# Patient Record
Sex: FEMALE | Race: WHITE | NOT HISPANIC OR LATINO
[De-identification: names, ages, dates, MRNs, and addresses within clinical notes are randomized per-mention and may not be internally consistent; named-entity substitution may affect disease eponyms.]

---

## 2017-02-06 ENCOUNTER — APPOINTMENT (OUTPATIENT)
Dept: OBGYN | Facility: CLINIC | Age: 72
End: 2017-02-06

## 2017-02-06 VITALS
BODY MASS INDEX: 20.02 KG/M2 | HEIGHT: 63 IN | DIASTOLIC BLOOD PRESSURE: 74 MMHG | WEIGHT: 113 LBS | SYSTOLIC BLOOD PRESSURE: 130 MMHG

## 2017-02-06 DIAGNOSIS — E07.9 DISORDER OF THYROID, UNSPECIFIED: ICD-10-CM

## 2017-02-06 DIAGNOSIS — Z12.12 ENCOUNTER FOR SCREENING FOR MALIGNANT NEOPLASM OF COLON: ICD-10-CM

## 2017-02-06 DIAGNOSIS — Z84.1 FAMILY HISTORY OF DISORDERS OF KIDNEY AND URETER: ICD-10-CM

## 2017-02-06 DIAGNOSIS — Z01.419 ENCOUNTER FOR GYNECOLOGICAL EXAMINATION (GENERAL) (ROUTINE) W/OUT ABNORMAL FINDINGS: ICD-10-CM

## 2017-02-06 DIAGNOSIS — Z12.11 ENCOUNTER FOR SCREENING FOR MALIGNANT NEOPLASM OF COLON: ICD-10-CM

## 2017-02-06 RX ORDER — SIMVASTATIN 40 MG/1
TABLET, FILM COATED ORAL
Refills: 0 | Status: ACTIVE | COMMUNITY

## 2017-02-06 RX ORDER — LEVOTHYROXINE SODIUM 200 MCG
VIAL (EA) INTRAVENOUS
Refills: 0 | Status: ACTIVE | COMMUNITY

## 2017-02-13 ENCOUNTER — MESSAGE (OUTPATIENT)
Age: 72
End: 2017-02-13

## 2017-02-13 LAB — CYTOLOGY CVX/VAG DOC THIN PREP: NORMAL

## 2017-04-17 ENCOUNTER — RX RENEWAL (OUTPATIENT)
Age: 72
End: 2017-04-17

## 2017-04-19 ENCOUNTER — CLINICAL ADVICE (OUTPATIENT)
Age: 72
End: 2017-04-19

## 2017-04-24 ENCOUNTER — RX RENEWAL (OUTPATIENT)
Age: 72
End: 2017-04-24

## 2017-04-24 DIAGNOSIS — R92.0 MAMMOGRAPHIC MICROCALCIFICATION FOUND ON DIAGNOSTIC IMAGING OF BREAST: ICD-10-CM

## 2017-07-18 ENCOUNTER — APPOINTMENT (OUTPATIENT)
Dept: SURGICAL ONCOLOGY | Facility: CLINIC | Age: 72
End: 2017-07-18

## 2017-07-18 VITALS
HEART RATE: 92 BPM | RESPIRATION RATE: 15 BRPM | OXYGEN SATURATION: 98 % | HEIGHT: 63 IN | DIASTOLIC BLOOD PRESSURE: 69 MMHG | SYSTOLIC BLOOD PRESSURE: 156 MMHG | BODY MASS INDEX: 19.49 KG/M2 | WEIGHT: 110 LBS

## 2017-07-18 DIAGNOSIS — Z80.0 FAMILY HISTORY OF MALIGNANT NEOPLASM OF DIGESTIVE ORGANS: ICD-10-CM

## 2017-07-18 DIAGNOSIS — Z86.39 PERSONAL HISTORY OF OTHER ENDOCRINE, NUTRITIONAL AND METABOLIC DISEASE: ICD-10-CM

## 2017-07-18 DIAGNOSIS — C44.311 BASAL CELL CARCINOMA OF SKIN OF NOSE: ICD-10-CM

## 2017-07-18 DIAGNOSIS — Z86.010 PERSONAL HISTORY OF COLONIC POLYPS: ICD-10-CM

## 2017-07-18 RX ORDER — UBIDECARENONE/VIT E ACET 100MG-5
CAPSULE ORAL
Refills: 0 | Status: ACTIVE | COMMUNITY

## 2017-07-26 ENCOUNTER — OUTPATIENT (OUTPATIENT)
Dept: OUTPATIENT SERVICES | Facility: HOSPITAL | Age: 72
LOS: 1 days | End: 2017-07-26
Payer: SELF-PAY

## 2017-07-26 DIAGNOSIS — N63 UNSPECIFIED LUMP IN BREAST: ICD-10-CM

## 2017-07-27 ENCOUNTER — RESULT REVIEW (OUTPATIENT)
Age: 72
End: 2017-07-27

## 2017-07-27 PROCEDURE — 88321 CONSLTJ&REPRT SLD PREP ELSWR: CPT

## 2017-07-31 LAB — SURGICAL PATHOLOGY STUDY: SIGNIFICANT CHANGE UP

## 2017-08-30 ENCOUNTER — FORM ENCOUNTER (OUTPATIENT)
Age: 72
End: 2017-08-30

## 2017-08-31 ENCOUNTER — APPOINTMENT (OUTPATIENT)
Dept: ULTRASOUND IMAGING | Facility: IMAGING CENTER | Age: 72
End: 2017-08-31

## 2017-08-31 ENCOUNTER — RESULT REVIEW (OUTPATIENT)
Age: 72
End: 2017-08-31

## 2017-08-31 ENCOUNTER — OUTPATIENT (OUTPATIENT)
Dept: OUTPATIENT SERVICES | Facility: HOSPITAL | Age: 72
LOS: 1 days | End: 2017-08-31
Payer: MEDICARE

## 2017-08-31 ENCOUNTER — APPOINTMENT (OUTPATIENT)
Dept: ULTRASOUND IMAGING | Facility: IMAGING CENTER | Age: 72
End: 2017-08-31
Payer: MEDICARE

## 2017-08-31 DIAGNOSIS — D05.12 INTRADUCTAL CARCINOMA IN SITU OF LEFT BREAST: ICD-10-CM

## 2017-08-31 PROCEDURE — 88305 TISSUE EXAM BY PATHOLOGIST: CPT | Mod: 26

## 2017-08-31 PROCEDURE — 77065 DX MAMMO INCL CAD UNI: CPT

## 2017-08-31 PROCEDURE — 76642 ULTRASOUND BREAST LIMITED: CPT

## 2017-08-31 PROCEDURE — 19083 BX BREAST 1ST LESION US IMAG: CPT

## 2017-08-31 PROCEDURE — 88305 TISSUE EXAM BY PATHOLOGIST: CPT

## 2017-08-31 PROCEDURE — G0206: CPT | Mod: 26,LT

## 2017-08-31 PROCEDURE — 76642 ULTRASOUND BREAST LIMITED: CPT | Mod: 26,LT

## 2017-08-31 PROCEDURE — A4648: CPT

## 2017-08-31 PROCEDURE — 19083 BX BREAST 1ST LESION US IMAG: CPT | Mod: LT

## 2017-09-05 LAB — SURGICAL PATHOLOGY STUDY: SIGNIFICANT CHANGE UP

## 2017-09-19 ENCOUNTER — OUTPATIENT (OUTPATIENT)
Dept: OUTPATIENT SERVICES | Facility: HOSPITAL | Age: 72
LOS: 1 days | End: 2017-09-19
Payer: MEDICARE

## 2017-09-19 VITALS
RESPIRATION RATE: 14 BRPM | SYSTOLIC BLOOD PRESSURE: 120 MMHG | DIASTOLIC BLOOD PRESSURE: 74 MMHG | HEART RATE: 77 BPM | HEIGHT: 60.75 IN | WEIGHT: 110.89 LBS | TEMPERATURE: 98 F

## 2017-09-19 DIAGNOSIS — E03.9 HYPOTHYROIDISM, UNSPECIFIED: ICD-10-CM

## 2017-09-19 DIAGNOSIS — D05.12 INTRADUCTAL CARCINOMA IN SITU OF LEFT BREAST: ICD-10-CM

## 2017-09-19 DIAGNOSIS — Z98.890 OTHER SPECIFIED POSTPROCEDURAL STATES: Chronic | ICD-10-CM

## 2017-09-19 LAB
BUN SERPL-MCNC: 17 MG/DL — SIGNIFICANT CHANGE UP (ref 7–23)
CALCIUM SERPL-MCNC: 9.9 MG/DL — SIGNIFICANT CHANGE UP (ref 8.4–10.5)
CHLORIDE SERPL-SCNC: 103 MMOL/L — SIGNIFICANT CHANGE UP (ref 98–107)
CO2 SERPL-SCNC: 27 MMOL/L — SIGNIFICANT CHANGE UP (ref 22–31)
CREAT SERPL-MCNC: 0.88 MG/DL — SIGNIFICANT CHANGE UP (ref 0.5–1.3)
GLUCOSE SERPL-MCNC: 134 MG/DL — HIGH (ref 70–99)
HCT VFR BLD CALC: 42.2 % — SIGNIFICANT CHANGE UP (ref 34.5–45)
HGB BLD-MCNC: 14.1 G/DL — SIGNIFICANT CHANGE UP (ref 11.5–15.5)
MCHC RBC-ENTMCNC: 30.2 PG — SIGNIFICANT CHANGE UP (ref 27–34)
MCHC RBC-ENTMCNC: 33.4 % — SIGNIFICANT CHANGE UP (ref 32–36)
MCV RBC AUTO: 90.4 FL — SIGNIFICANT CHANGE UP (ref 80–100)
NRBC # FLD: 0 — SIGNIFICANT CHANGE UP
PLATELET # BLD AUTO: 212 K/UL — SIGNIFICANT CHANGE UP (ref 150–400)
PMV BLD: 9.9 FL — SIGNIFICANT CHANGE UP (ref 7–13)
POTASSIUM SERPL-MCNC: 3.9 MMOL/L — SIGNIFICANT CHANGE UP (ref 3.5–5.3)
POTASSIUM SERPL-SCNC: 3.9 MMOL/L — SIGNIFICANT CHANGE UP (ref 3.5–5.3)
RBC # BLD: 4.67 M/UL — SIGNIFICANT CHANGE UP (ref 3.8–5.2)
RBC # FLD: 12.5 % — SIGNIFICANT CHANGE UP (ref 10.3–14.5)
SODIUM SERPL-SCNC: 142 MMOL/L — SIGNIFICANT CHANGE UP (ref 135–145)
WBC # BLD: 7.02 K/UL — SIGNIFICANT CHANGE UP (ref 3.8–10.5)
WBC # FLD AUTO: 7.02 K/UL — SIGNIFICANT CHANGE UP (ref 3.8–10.5)

## 2017-09-19 PROCEDURE — 93010 ELECTROCARDIOGRAM REPORT: CPT

## 2017-09-19 NOTE — H&P PST ADULT - LYMPHATIC
posterior cervical R/anterior cervical L/supraclavicular L/posterior cervical L/supraclavicular R/anterior cervical R

## 2017-09-19 NOTE — H&P PST ADULT - FAMILY HISTORY
Father  Still living? No  Family history of chronic renal failure, Age at diagnosis: Age Unknown     Sibling  Still living? Yes, Estimated age: Age Unknown  Family history of breast cancer in sister, Age at diagnosis: Age Unknown

## 2017-09-19 NOTE — H&P PST ADULT - RS GEN PE MLT RESP DETAILS PC
no rhonchi/no rales/breath sounds equal/no wheezes/respirations non-labored/clear to auscultation bilaterally

## 2017-09-19 NOTE — H&P PST ADULT - NEGATIVE BREAST SYMPTOMS
no breast lump L/no nipple discharge L/no breast lump R/no nipple discharge R/no breast tenderness R/no breast tenderness L

## 2017-09-19 NOTE — H&P PST ADULT - GASTROINTESTINAL DETAILS
no distention/no guarding/nontender/soft/no bruit/bowel sounds normal/no rebound tenderness/no rigidity/no organomegaly/no masses palpable

## 2017-09-19 NOTE — H&P PST ADULT - PROBLEM SELECTOR PLAN 1
Scheduled for Left Breast Lumpectomy with Mammographic Localization on 10/05/17.  Lab results pending.  Preop, famotidine and chlorhexidine instructions provided and questions addressed.

## 2017-09-19 NOTE — H&P PST ADULT - HISTORY OF PRESENT ILLNESS
71 yr old female with medical hx of hypothyroidism and hyperlipidemia presents for preop evaluation with dx of Intraductal Carcinoma insitu left breast.  Pt s/p routine mammogram and sonogram which revealed mass.  Pt subsequently had biopsy and lumpectomy on June 8.  As per pt "the margins were not enough".  Pt is now scheduled for Left Breast Lumpectomy with Mammography Localization on 10/05/17.

## 2017-09-19 NOTE — H&P PST ADULT - PMH
Hyperlipidemia, unspecified hyperlipidemia type    Hypothyroidism    Intraductal carcinoma in situ of left breast

## 2017-09-27 ENCOUNTER — TRANSCRIPTION ENCOUNTER (OUTPATIENT)
Age: 72
End: 2017-09-27

## 2017-10-04 ENCOUNTER — FORM ENCOUNTER (OUTPATIENT)
Age: 72
End: 2017-10-04

## 2017-10-05 ENCOUNTER — RESULT REVIEW (OUTPATIENT)
Age: 72
End: 2017-10-05

## 2017-10-05 ENCOUNTER — TRANSCRIPTION ENCOUNTER (OUTPATIENT)
Age: 72
End: 2017-10-05

## 2017-10-05 ENCOUNTER — OUTPATIENT (OUTPATIENT)
Dept: OUTPATIENT SERVICES | Facility: HOSPITAL | Age: 72
LOS: 1 days | End: 2017-10-05
Payer: MEDICARE

## 2017-10-05 ENCOUNTER — APPOINTMENT (OUTPATIENT)
Dept: SURGICAL ONCOLOGY | Facility: AMBULATORY SURGERY CENTER | Age: 72
End: 2017-10-05

## 2017-10-05 ENCOUNTER — APPOINTMENT (OUTPATIENT)
Dept: MAMMOGRAPHY | Facility: IMAGING CENTER | Age: 72
End: 2017-10-05
Payer: MEDICARE

## 2017-10-05 ENCOUNTER — OUTPATIENT (OUTPATIENT)
Dept: OUTPATIENT SERVICES | Facility: HOSPITAL | Age: 72
LOS: 1 days | Discharge: HOME | End: 2017-10-05
Payer: MEDICARE

## 2017-10-05 VITALS
HEART RATE: 74 BPM | RESPIRATION RATE: 16 BRPM | WEIGHT: 110.89 LBS | SYSTOLIC BLOOD PRESSURE: 136 MMHG | OXYGEN SATURATION: 97 % | DIASTOLIC BLOOD PRESSURE: 61 MMHG | HEIGHT: 60.63 IN | TEMPERATURE: 97 F

## 2017-10-05 VITALS
TEMPERATURE: 98 F | OXYGEN SATURATION: 98 % | SYSTOLIC BLOOD PRESSURE: 102 MMHG | RESPIRATION RATE: 15 BRPM | HEART RATE: 76 BPM | DIASTOLIC BLOOD PRESSURE: 50 MMHG

## 2017-10-05 DIAGNOSIS — Z00.8 ENCOUNTER FOR OTHER GENERAL EXAMINATION: ICD-10-CM

## 2017-10-05 DIAGNOSIS — Z98.890 OTHER SPECIFIED POSTPROCEDURAL STATES: Chronic | ICD-10-CM

## 2017-10-05 DIAGNOSIS — D05.12 INTRADUCTAL CARCINOMA IN SITU OF LEFT BREAST: ICD-10-CM

## 2017-10-05 PROCEDURE — 88342 IMHCHEM/IMCYTCHM 1ST ANTB: CPT | Mod: 26

## 2017-10-05 PROCEDURE — 76098 X-RAY EXAM SURGICAL SPECIMEN: CPT

## 2017-10-05 PROCEDURE — 19281 PERQ DEVICE BREAST 1ST IMAG: CPT | Mod: LT

## 2017-10-05 PROCEDURE — 76098 X-RAY EXAM SURGICAL SPECIMEN: CPT | Mod: 26

## 2017-10-05 PROCEDURE — 19281 PERQ DEVICE BREAST 1ST IMAG: CPT

## 2017-10-05 PROCEDURE — 19287 PERQ DEV BREAST 1ST MR GUIDE: CPT | Mod: 59

## 2017-10-05 PROCEDURE — 88305 TISSUE EXAM BY PATHOLOGIST: CPT | Mod: 26

## 2017-10-05 PROCEDURE — C1769: CPT

## 2017-10-05 PROCEDURE — 19301 PARTIAL MASTECTOMY: CPT | Mod: LT

## 2017-10-05 NOTE — ASU DISCHARGE PLAN (ADULT/PEDIATRIC). - NOTIFY
Swelling that continues/Fever greater than 101/Bleeding that does not stop/Persistent Nausea and Vomiting

## 2017-10-05 NOTE — BRIEF OPERATIVE NOTE - PROCEDURE
<<-----Click on this checkbox to enter Procedure Lumpectomy of left breast  10/05/2017  Mammogram localization  Active  DFILARDI

## 2017-10-10 LAB — SURGICAL PATHOLOGY STUDY: SIGNIFICANT CHANGE UP

## 2017-10-17 ENCOUNTER — APPOINTMENT (OUTPATIENT)
Dept: SURGICAL ONCOLOGY | Facility: CLINIC | Age: 72
End: 2017-10-17
Payer: MEDICARE

## 2017-10-17 VITALS
DIASTOLIC BLOOD PRESSURE: 71 MMHG | HEIGHT: 63 IN | RESPIRATION RATE: 15 BRPM | HEART RATE: 109 BPM | WEIGHT: 110 LBS | BODY MASS INDEX: 19.49 KG/M2 | TEMPERATURE: 97.6 F | OXYGEN SATURATION: 98 % | SYSTOLIC BLOOD PRESSURE: 130 MMHG

## 2017-10-17 PROCEDURE — 99024 POSTOP FOLLOW-UP VISIT: CPT

## 2017-11-03 ENCOUNTER — OUTPATIENT (OUTPATIENT)
Dept: OUTPATIENT SERVICES | Facility: HOSPITAL | Age: 72
LOS: 1 days | Discharge: HOME | End: 2017-11-03

## 2017-11-03 DIAGNOSIS — Z98.890 OTHER SPECIFIED POSTPROCEDURAL STATES: Chronic | ICD-10-CM

## 2017-11-03 DIAGNOSIS — C50.919 MALIGNANT NEOPLASM OF UNSPECIFIED SITE OF UNSPECIFIED FEMALE BREAST: ICD-10-CM

## 2017-11-06 ENCOUNTER — APPOINTMENT (OUTPATIENT)
Dept: HEMATOLOGY ONCOLOGY | Facility: CLINIC | Age: 72
End: 2017-11-06
Payer: MEDICARE

## 2017-11-06 VITALS
RESPIRATION RATE: 16 BRPM | SYSTOLIC BLOOD PRESSURE: 127 MMHG | BODY MASS INDEX: 19.45 KG/M2 | DIASTOLIC BLOOD PRESSURE: 70 MMHG | OXYGEN SATURATION: 100 % | WEIGHT: 109.79 LBS | HEART RATE: 70 BPM | HEIGHT: 62.8 IN | TEMPERATURE: 98.2 F

## 2017-11-06 DIAGNOSIS — Z78.9 OTHER SPECIFIED HEALTH STATUS: ICD-10-CM

## 2017-11-06 DIAGNOSIS — Z80.3 FAMILY HISTORY OF MALIGNANT NEOPLASM OF BREAST: ICD-10-CM

## 2017-11-06 PROCEDURE — 99205 OFFICE O/P NEW HI 60 MIN: CPT

## 2018-01-31 ENCOUNTER — OUTPATIENT (OUTPATIENT)
Dept: OUTPATIENT SERVICES | Facility: HOSPITAL | Age: 73
LOS: 1 days | Discharge: HOME | End: 2018-01-31

## 2018-01-31 DIAGNOSIS — Z98.890 OTHER SPECIFIED POSTPROCEDURAL STATES: Chronic | ICD-10-CM

## 2018-01-31 DIAGNOSIS — C50.919 MALIGNANT NEOPLASM OF UNSPECIFIED SITE OF UNSPECIFIED FEMALE BREAST: ICD-10-CM

## 2018-02-05 ENCOUNTER — APPOINTMENT (OUTPATIENT)
Dept: HEMATOLOGY ONCOLOGY | Facility: CLINIC | Age: 73
End: 2018-02-05
Payer: MEDICARE

## 2018-02-05 ENCOUNTER — RESULT REVIEW (OUTPATIENT)
Age: 73
End: 2018-02-05

## 2018-02-05 VITALS
WEIGHT: 113.54 LBS | OXYGEN SATURATION: 98 % | HEART RATE: 76 BPM | TEMPERATURE: 97.8 F | RESPIRATION RATE: 16 BRPM | SYSTOLIC BLOOD PRESSURE: 153 MMHG | DIASTOLIC BLOOD PRESSURE: 81 MMHG | BODY MASS INDEX: 20.24 KG/M2

## 2018-02-05 LAB
BASOPHILS # BLD AUTO: 0.1 K/UL — SIGNIFICANT CHANGE UP (ref 0–0.2)
BASOPHILS NFR BLD AUTO: 0.8 % — SIGNIFICANT CHANGE UP (ref 0–2)
EOSINOPHIL # BLD AUTO: 0.1 K/UL — SIGNIFICANT CHANGE UP (ref 0–0.5)
EOSINOPHIL NFR BLD AUTO: 1.3 % — SIGNIFICANT CHANGE UP (ref 0–6)
HCT VFR BLD CALC: 40.4 % — SIGNIFICANT CHANGE UP (ref 34.5–45)
HGB BLD-MCNC: 14.1 G/DL — SIGNIFICANT CHANGE UP (ref 11.5–15.5)
LYMPHOCYTES # BLD AUTO: 2.1 K/UL — SIGNIFICANT CHANGE UP (ref 1–3.3)
LYMPHOCYTES # BLD AUTO: 27.1 % — SIGNIFICANT CHANGE UP (ref 13–44)
MCHC RBC-ENTMCNC: 31.9 PG — SIGNIFICANT CHANGE UP (ref 27–34)
MCHC RBC-ENTMCNC: 34.8 G/DL — SIGNIFICANT CHANGE UP (ref 32–36)
MCV RBC AUTO: 91.5 FL — SIGNIFICANT CHANGE UP (ref 80–100)
MONOCYTES # BLD AUTO: 0.7 K/UL — SIGNIFICANT CHANGE UP (ref 0–0.9)
MONOCYTES NFR BLD AUTO: 8.8 % — SIGNIFICANT CHANGE UP (ref 2–14)
NEUTROPHILS # BLD AUTO: 4.9 K/UL — SIGNIFICANT CHANGE UP (ref 1.8–7.4)
NEUTROPHILS NFR BLD AUTO: 61.9 % — SIGNIFICANT CHANGE UP (ref 43–77)
PLATELET # BLD AUTO: 228 K/UL — SIGNIFICANT CHANGE UP (ref 150–400)
RBC # BLD: 4.42 M/UL — SIGNIFICANT CHANGE UP (ref 3.8–5.2)
RBC # FLD: 11.2 % — SIGNIFICANT CHANGE UP (ref 10.3–14.5)
WBC # BLD: 7.9 K/UL — SIGNIFICANT CHANGE UP (ref 3.8–10.5)
WBC # FLD AUTO: 7.9 K/UL — SIGNIFICANT CHANGE UP (ref 3.8–10.5)

## 2018-02-05 PROCEDURE — 99215 OFFICE O/P EST HI 40 MIN: CPT

## 2018-02-06 LAB
25(OH)D3 SERPL-MCNC: 38.6 NG/ML
ALBUMIN SERPL ELPH-MCNC: 5 G/DL
ALP BLD-CCNC: 54 U/L
ALT SERPL-CCNC: 20 U/L
ANION GAP SERPL CALC-SCNC: 12 MMOL/L
AST SERPL-CCNC: 24 U/L
BILIRUB SERPL-MCNC: 1 MG/DL
BUN SERPL-MCNC: 21 MG/DL
CALCIUM SERPL-MCNC: 10.8 MG/DL
CHLORIDE SERPL-SCNC: 102 MMOL/L
CO2 SERPL-SCNC: 26 MMOL/L
CREAT SERPL-MCNC: 0.87 MG/DL
GLUCOSE SERPL-MCNC: 101 MG/DL
POTASSIUM SERPL-SCNC: 4.9 MMOL/L
PROT SERPL-MCNC: 7.6 G/DL
SODIUM SERPL-SCNC: 140 MMOL/L

## 2018-04-16 ENCOUNTER — FORM ENCOUNTER (OUTPATIENT)
Age: 73
End: 2018-04-16

## 2018-04-17 ENCOUNTER — OUTPATIENT (OUTPATIENT)
Dept: OUTPATIENT SERVICES | Facility: HOSPITAL | Age: 73
LOS: 1 days | End: 2018-04-17
Payer: MEDICARE

## 2018-04-17 ENCOUNTER — APPOINTMENT (OUTPATIENT)
Dept: SURGICAL ONCOLOGY | Facility: CLINIC | Age: 73
End: 2018-04-17
Payer: MEDICARE

## 2018-04-17 ENCOUNTER — APPOINTMENT (OUTPATIENT)
Dept: RADIOLOGY | Facility: IMAGING CENTER | Age: 73
End: 2018-04-17
Payer: MEDICARE

## 2018-04-17 VITALS
HEIGHT: 62 IN | SYSTOLIC BLOOD PRESSURE: 136 MMHG | BODY MASS INDEX: 20.06 KG/M2 | OXYGEN SATURATION: 97 % | DIASTOLIC BLOOD PRESSURE: 78 MMHG | WEIGHT: 109 LBS | HEART RATE: 95 BPM

## 2018-04-17 DIAGNOSIS — Z98.890 OTHER SPECIFIED POSTPROCEDURAL STATES: Chronic | ICD-10-CM

## 2018-04-17 DIAGNOSIS — M85.80 OTHER SPECIFIED DISORDERS OF BONE DENSITY AND STRUCTURE, UNSPECIFIED SITE: ICD-10-CM

## 2018-04-17 PROCEDURE — 99215 OFFICE O/P EST HI 40 MIN: CPT

## 2018-04-17 PROCEDURE — 77080 DXA BONE DENSITY AXIAL: CPT

## 2018-04-17 PROCEDURE — 77080 DXA BONE DENSITY AXIAL: CPT | Mod: 26

## 2018-05-04 ENCOUNTER — RX RENEWAL (OUTPATIENT)
Age: 73
End: 2018-05-04

## 2018-05-15 ENCOUNTER — OUTPATIENT (OUTPATIENT)
Dept: OUTPATIENT SERVICES | Facility: HOSPITAL | Age: 73
LOS: 1 days | End: 2018-05-15
Payer: MEDICARE

## 2018-05-15 ENCOUNTER — APPOINTMENT (OUTPATIENT)
Dept: MAMMOGRAPHY | Facility: IMAGING CENTER | Age: 73
End: 2018-05-15
Payer: MEDICARE

## 2018-05-15 ENCOUNTER — APPOINTMENT (OUTPATIENT)
Dept: ULTRASOUND IMAGING | Facility: IMAGING CENTER | Age: 73
End: 2018-05-15
Payer: MEDICARE

## 2018-05-15 DIAGNOSIS — Z98.890 OTHER SPECIFIED POSTPROCEDURAL STATES: Chronic | ICD-10-CM

## 2018-05-15 DIAGNOSIS — Z00.8 ENCOUNTER FOR OTHER GENERAL EXAMINATION: ICD-10-CM

## 2018-05-15 PROCEDURE — 77066 DX MAMMO INCL CAD BI: CPT

## 2018-05-15 PROCEDURE — 77066 DX MAMMO INCL CAD BI: CPT | Mod: 26

## 2018-05-15 PROCEDURE — G0279: CPT

## 2018-05-15 PROCEDURE — 76641 ULTRASOUND BREAST COMPLETE: CPT | Mod: 26,50

## 2018-05-15 PROCEDURE — G0279: CPT | Mod: 26

## 2018-05-15 PROCEDURE — 76641 ULTRASOUND BREAST COMPLETE: CPT

## 2018-06-05 ENCOUNTER — RX RENEWAL (OUTPATIENT)
Age: 73
End: 2018-06-05

## 2018-07-26 ENCOUNTER — OUTPATIENT (OUTPATIENT)
Dept: OUTPATIENT SERVICES | Facility: HOSPITAL | Age: 73
LOS: 1 days | Discharge: HOME | End: 2018-07-26

## 2018-07-26 DIAGNOSIS — C50.919 MALIGNANT NEOPLASM OF UNSPECIFIED SITE OF UNSPECIFIED FEMALE BREAST: ICD-10-CM

## 2018-07-26 DIAGNOSIS — Z98.890 OTHER SPECIFIED POSTPROCEDURAL STATES: Chronic | ICD-10-CM

## 2018-07-26 PROBLEM — E78.5 HYPERLIPIDEMIA, UNSPECIFIED: Chronic | Status: ACTIVE | Noted: 2017-09-19

## 2018-07-26 PROBLEM — E03.9 HYPOTHYROIDISM, UNSPECIFIED: Chronic | Status: ACTIVE | Noted: 2017-09-19

## 2018-07-26 PROBLEM — D05.12 INTRADUCTAL CARCINOMA IN SITU OF LEFT BREAST: Chronic | Status: ACTIVE | Noted: 2017-09-19

## 2018-07-27 PROBLEM — Z80.0 FAMILY HISTORY OF COLON CANCER: Status: ACTIVE | Noted: 2017-07-18

## 2018-07-27 PROBLEM — C44.311 BASAL CELL CARCINOMA OF NOSE: Status: RESOLVED | Noted: 2017-07-18 | Resolved: 2018-07-27

## 2018-07-30 NOTE — ASU PREOP CHECKLIST - TAMPON REMOVED
n/a My signature below certifies that the above stated patient is homebound and upon completion of the Face-To-Face encounter, has the need for intermittent skilled nursing, physical therapy and/or speech or occupational therapy services in their home for their current diagnosis as outlined in their initial plan of care. These services will continue to be monitored by myself or another physician.

## 2018-07-31 ENCOUNTER — RX RENEWAL (OUTPATIENT)
Age: 73
End: 2018-07-31

## 2018-09-05 ENCOUNTER — OUTPATIENT (OUTPATIENT)
Dept: OUTPATIENT SERVICES | Facility: HOSPITAL | Age: 73
LOS: 1 days | Discharge: HOME | End: 2018-09-05

## 2018-09-05 DIAGNOSIS — Z98.890 OTHER SPECIFIED POSTPROCEDURAL STATES: Chronic | ICD-10-CM

## 2018-09-05 DIAGNOSIS — C50.919 MALIGNANT NEOPLASM OF UNSPECIFIED SITE OF UNSPECIFIED FEMALE BREAST: ICD-10-CM

## 2018-09-12 ENCOUNTER — RESULT REVIEW (OUTPATIENT)
Age: 73
End: 2018-09-12

## 2018-09-12 ENCOUNTER — APPOINTMENT (OUTPATIENT)
Dept: HEMATOLOGY ONCOLOGY | Facility: CLINIC | Age: 73
End: 2018-09-12
Payer: MEDICARE

## 2018-09-12 VITALS
RESPIRATION RATE: 17 BRPM | BODY MASS INDEX: 20.56 KG/M2 | WEIGHT: 112.44 LBS | TEMPERATURE: 98.2 F | DIASTOLIC BLOOD PRESSURE: 75 MMHG | OXYGEN SATURATION: 98 % | HEART RATE: 75 BPM | SYSTOLIC BLOOD PRESSURE: 134 MMHG

## 2018-09-12 LAB
BASOPHILS # BLD AUTO: 0.1 K/UL — SIGNIFICANT CHANGE UP (ref 0–0.2)
BASOPHILS NFR BLD AUTO: 0.9 % — SIGNIFICANT CHANGE UP (ref 0–2)
EOSINOPHIL # BLD AUTO: 0.1 K/UL — SIGNIFICANT CHANGE UP (ref 0–0.5)
EOSINOPHIL NFR BLD AUTO: 1.9 % — SIGNIFICANT CHANGE UP (ref 0–6)
HCT VFR BLD CALC: 42.1 % — SIGNIFICANT CHANGE UP (ref 34.5–45)
HGB BLD-MCNC: 14.2 G/DL — SIGNIFICANT CHANGE UP (ref 11.5–15.5)
LYMPHOCYTES # BLD AUTO: 2.3 K/UL — SIGNIFICANT CHANGE UP (ref 1–3.3)
LYMPHOCYTES # BLD AUTO: 30.3 % — SIGNIFICANT CHANGE UP (ref 13–44)
MCHC RBC-ENTMCNC: 31.1 PG — SIGNIFICANT CHANGE UP (ref 27–34)
MCHC RBC-ENTMCNC: 33.6 G/DL — SIGNIFICANT CHANGE UP (ref 32–36)
MCV RBC AUTO: 92.4 FL — SIGNIFICANT CHANGE UP (ref 80–100)
MONOCYTES # BLD AUTO: 0.8 K/UL — SIGNIFICANT CHANGE UP (ref 0–0.9)
MONOCYTES NFR BLD AUTO: 9.9 % — SIGNIFICANT CHANGE UP (ref 2–14)
NEUTROPHILS # BLD AUTO: 4.3 K/UL — SIGNIFICANT CHANGE UP (ref 1.8–7.4)
NEUTROPHILS NFR BLD AUTO: 57 % — SIGNIFICANT CHANGE UP (ref 43–77)
PLATELET # BLD AUTO: 221 K/UL — SIGNIFICANT CHANGE UP (ref 150–400)
RBC # BLD: 4.56 M/UL — SIGNIFICANT CHANGE UP (ref 3.8–5.2)
RBC # FLD: 11.6 % — SIGNIFICANT CHANGE UP (ref 10.3–14.5)
WBC # BLD: 7.6 K/UL — SIGNIFICANT CHANGE UP (ref 3.8–10.5)
WBC # FLD AUTO: 7.6 K/UL — SIGNIFICANT CHANGE UP (ref 3.8–10.5)

## 2018-09-12 PROCEDURE — 99214 OFFICE O/P EST MOD 30 MIN: CPT

## 2018-09-18 LAB
25(OH)D3 SERPL-MCNC: 37.6 NG/ML
ALBUMIN SERPL ELPH-MCNC: 5.1 G/DL
ALP BLD-CCNC: 60 U/L
ALT SERPL-CCNC: 19 U/L
ANION GAP SERPL CALC-SCNC: 13 MMOL/L
AST SERPL-CCNC: 21 U/L
BILIRUB SERPL-MCNC: 0.7 MG/DL
BUN SERPL-MCNC: 19 MG/DL
CALCIUM SERPL-MCNC: 10 MG/DL
CHLORIDE SERPL-SCNC: 103 MMOL/L
CO2 SERPL-SCNC: 26 MMOL/L
CREAT SERPL-MCNC: 0.83 MG/DL
GLUCOSE SERPL-MCNC: 90 MG/DL
POTASSIUM SERPL-SCNC: 4.8 MMOL/L
PROT SERPL-MCNC: 7.6 G/DL
SODIUM SERPL-SCNC: 142 MMOL/L

## 2019-03-18 ENCOUNTER — OUTPATIENT (OUTPATIENT)
Dept: OUTPATIENT SERVICES | Facility: HOSPITAL | Age: 74
LOS: 1 days | Discharge: HOME | End: 2019-03-18

## 2019-03-18 DIAGNOSIS — Z98.890 OTHER SPECIFIED POSTPROCEDURAL STATES: Chronic | ICD-10-CM

## 2019-03-18 DIAGNOSIS — C50.919 MALIGNANT NEOPLASM OF UNSPECIFIED SITE OF UNSPECIFIED FEMALE BREAST: ICD-10-CM

## 2019-03-22 ENCOUNTER — TRANSCRIPTION ENCOUNTER (OUTPATIENT)
Age: 74
End: 2019-03-22

## 2019-03-22 ENCOUNTER — APPOINTMENT (OUTPATIENT)
Dept: HEMATOLOGY ONCOLOGY | Facility: CLINIC | Age: 74
End: 2019-03-22
Payer: MEDICARE

## 2019-03-22 ENCOUNTER — RESULT REVIEW (OUTPATIENT)
Age: 74
End: 2019-03-22

## 2019-03-22 VITALS
BODY MASS INDEX: 20.77 KG/M2 | SYSTOLIC BLOOD PRESSURE: 166 MMHG | OXYGEN SATURATION: 100 % | RESPIRATION RATE: 22 BRPM | HEART RATE: 75 BPM | WEIGHT: 113.54 LBS | TEMPERATURE: 98.1 F | DIASTOLIC BLOOD PRESSURE: 75 MMHG

## 2019-03-22 LAB
ALBUMIN SERPL ELPH-MCNC: 4.9 G/DL
ALP BLD-CCNC: 69 U/L
ALT SERPL-CCNC: 21 U/L
ANION GAP SERPL CALC-SCNC: 12 MMOL/L
AST SERPL-CCNC: 24 U/L
BASOPHILS # BLD AUTO: 0.1 K/UL — SIGNIFICANT CHANGE UP (ref 0–0.2)
BASOPHILS NFR BLD AUTO: 0.8 % — SIGNIFICANT CHANGE UP (ref 0–2)
BILIRUB SERPL-MCNC: 0.9 MG/DL
BUN SERPL-MCNC: 19 MG/DL
CALCIUM SERPL-MCNC: 10.2 MG/DL
CHLORIDE SERPL-SCNC: 105 MMOL/L
CO2 SERPL-SCNC: 27 MMOL/L
CREAT SERPL-MCNC: 0.78 MG/DL
EOSINOPHIL # BLD AUTO: 0.1 K/UL — SIGNIFICANT CHANGE UP (ref 0–0.5)
EOSINOPHIL NFR BLD AUTO: 1.5 % — SIGNIFICANT CHANGE UP (ref 0–6)
GLUCOSE SERPL-MCNC: 97 MG/DL
HCT VFR BLD CALC: 41.7 % — SIGNIFICANT CHANGE UP (ref 34.5–45)
HGB BLD-MCNC: 14.3 G/DL — SIGNIFICANT CHANGE UP (ref 11.5–15.5)
LYMPHOCYTES # BLD AUTO: 2.2 K/UL — SIGNIFICANT CHANGE UP (ref 1–3.3)
LYMPHOCYTES # BLD AUTO: 28 % — SIGNIFICANT CHANGE UP (ref 13–44)
MCHC RBC-ENTMCNC: 31.2 PG — SIGNIFICANT CHANGE UP (ref 27–34)
MCHC RBC-ENTMCNC: 34.3 G/DL — SIGNIFICANT CHANGE UP (ref 32–36)
MCV RBC AUTO: 90.9 FL — SIGNIFICANT CHANGE UP (ref 80–100)
MONOCYTES # BLD AUTO: 0.7 K/UL — SIGNIFICANT CHANGE UP (ref 0–0.9)
MONOCYTES NFR BLD AUTO: 9 % — SIGNIFICANT CHANGE UP (ref 2–14)
NEUTROPHILS # BLD AUTO: 4.8 K/UL — SIGNIFICANT CHANGE UP (ref 1.8–7.4)
NEUTROPHILS NFR BLD AUTO: 60.8 % — SIGNIFICANT CHANGE UP (ref 43–77)
PLATELET # BLD AUTO: 259 K/UL — SIGNIFICANT CHANGE UP (ref 150–400)
POTASSIUM SERPL-SCNC: 4.1 MMOL/L
PROT SERPL-MCNC: 7.4 G/DL
RBC # BLD: 4.59 M/UL — SIGNIFICANT CHANGE UP (ref 3.8–5.2)
RBC # FLD: 11.4 % — SIGNIFICANT CHANGE UP (ref 10.3–14.5)
SODIUM SERPL-SCNC: 144 MMOL/L
WBC # BLD: 7.9 K/UL — SIGNIFICANT CHANGE UP (ref 3.8–10.5)
WBC # FLD AUTO: 7.9 K/UL — SIGNIFICANT CHANGE UP (ref 3.8–10.5)

## 2019-03-22 PROCEDURE — 99214 OFFICE O/P EST MOD 30 MIN: CPT

## 2019-03-26 NOTE — ASSESSMENT
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 She is status post lumpectomy with negative margins. Her medical history is significant for hypercholesterolemia , osteopenia and hypothyroidism. She is otherwise very healthy, has a great performance status and is working part-time. She started anastrazole 11/2017\par \par - DCIS: Clinically, no evidence of disease. She is tolerating anastrozole very well without significant side effects.  Reports good compliance. Plan to continue AI for total of 5 years. She is up to date with breast imaging.\par - Osteopenia: concern for worsening bone density due to anastrozole. Rec to  continue vit D and start calcium. DEXA 1-2 yrs. \par - Hypercalcemia in the past: now resolved. She will take calcium QOD and we will monitor levels\par - High cholesterol: concern for worsening cholesterol due to anastrozole. Pt is on zocor. Lipid profile annually. Lifestyle modifications reviewed- healthy eating and exercise\par - - Mild hot flashes: 2/2 anastrozole. Advised to wear layers and use fan prn. Consider Effexor if get worse.\par \par RTC 6 m

## 2019-03-26 NOTE — PHYSICAL EXAM
[Fully active, able to carry on all pre-disease performance without restriction] : Status 0 - Fully active, able to carry on all pre-disease performance without restriction [Normal] : affect appropriate [de-identified] : healed lumpectomy scar in the left breast.

## 2019-03-26 NOTE — CONSULT LETTER
[Dear  ___] : Dear  [unfilled], [Consult Letter:] : I had the pleasure of evaluating your patient, [unfilled]. [Please see my note below.] : Please see my note below. [Consult Closing:] : Thank you very much for allowing me to participate in the care of this patient.  If you have any questions, please do not hesitate to contact me. [Sincerely,] : Sincerely, [DrDipika  ___] : Dr. PATEL [FreeTextEntry3] : Angela Berry M.D.\par  of Medicine\par [facility]\par [facility] [place]\par [address] [place] [address]\par [address]\par Tele # [phone]; Fax [phone]

## 2019-03-26 NOTE — OB HISTORY
[Currently In Menopause] : currently in menopause [___] : Living: [unfilled] [Experiencing Menopausal Sxs] : not experiencing menopausal symptoms

## 2019-03-26 NOTE — HISTORY OF PRESENT ILLNESS
[8 - Distress Level] : Distress Level: 8 [Patient given social work contact information and resource sheet] : Patient was given social work contact information and resource sheet [0] : 0 [de-identified] : Ms. LAN CARO is a 72 year old female here for an evaluation of breast cancer. Her oncologic history is as follows:\par \par She underwent routine breast imaging on 4/17/17 which showed suspicious subcentimeter nodule in the left breast for which biopsy was recommended. She underwent left breast 11:00 ultrasound-guided core biopsy on 4/17/17 which showed atypical lobular hyperplasia. She underwent breast MRI on 5/12/17 which was read as BI-RADS 2\par \par She underwent excision with wire guided localization by Dr Filipe Pineda on 6/8/17 which showed intraductal carcinoma, grade 2, cribriform and micropapillary type with intermediate to low nuclear grade, 4 mm in greatest dimension. Intraductal carcinoma was 0.8 mm from the inked anterior margin. DCIS was strongly positive for ER and [place]. Focus of LCIS and atypical hyperplasia was noted.\par \par She seeked a second opinion from Dr. Hill. She underwent a breast MRI on 8/4/17 which showed a 2.1 cm area of enhancement in the upper outer quadrant of the left breast for which surgical excision was recommended.\par \par She underwent a left breast 11:00 core biopsy on 8/21/17 which showed ductal carcinoma in site 2, cribriform pattern with low grade nuclear atypia measured 1.5 mm with biopsy site changes.\par \par She underwent reexcision on 10/5/17 which showed a focus of atypical ductal hyperplasia and LCIS, classic type. Margins negative.\par \par Most recent bone density is from April 2017 which showed osteopenia with the lowest T score of -2.2 and the right femoral neck [de-identified] : Ms. 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 She has mild hot flashes. No aches/pain, no vag dryness, no excessive fatigue, no GI s/e. She is active, no change in energy, wt or appetite. Her cholesterol f/b PMD- well controlled\par mammogram with Dr Howard 5/2018 BIRADS 2\par DEXA- 04/2018- osteopenia(-1.9), on vit D. Takes no ca due to g/o high ben, reminded to take Qd\par

## 2019-04-04 LAB — 25(OH)D3 SERPL-MCNC: 40 NG/ML

## 2019-05-29 ENCOUNTER — FORM ENCOUNTER (OUTPATIENT)
Age: 74
End: 2019-05-29

## 2019-05-30 ENCOUNTER — APPOINTMENT (OUTPATIENT)
Age: 74
End: 2019-05-30
Payer: MEDICARE

## 2019-05-30 ENCOUNTER — OUTPATIENT (OUTPATIENT)
Dept: OUTPATIENT SERVICES | Facility: HOSPITAL | Age: 74
LOS: 1 days | End: 2019-05-30
Payer: MEDICARE

## 2019-05-30 DIAGNOSIS — Z98.890 OTHER SPECIFIED POSTPROCEDURAL STATES: Chronic | ICD-10-CM

## 2019-05-30 DIAGNOSIS — D05.12 INTRADUCTAL CARCINOMA IN SITU OF LEFT BREAST: ICD-10-CM

## 2019-05-30 PROCEDURE — G0279: CPT | Mod: 26

## 2019-05-30 PROCEDURE — G0279: CPT

## 2019-05-30 PROCEDURE — 77066 DX MAMMO INCL CAD BI: CPT | Mod: 26

## 2019-05-30 PROCEDURE — 77080 DXA BONE DENSITY AXIAL: CPT | Mod: 26

## 2019-05-30 PROCEDURE — 76641 ULTRASOUND BREAST COMPLETE: CPT | Mod: 26,50

## 2019-05-30 PROCEDURE — 77066 DX MAMMO INCL CAD BI: CPT

## 2019-05-30 PROCEDURE — 77080 DXA BONE DENSITY AXIAL: CPT

## 2019-05-30 PROCEDURE — 76641 ULTRASOUND BREAST COMPLETE: CPT

## 2019-09-11 ENCOUNTER — OUTPATIENT (OUTPATIENT)
Dept: OUTPATIENT SERVICES | Facility: HOSPITAL | Age: 74
LOS: 1 days | Discharge: HOME | End: 2019-09-11

## 2019-09-11 DIAGNOSIS — Z98.890 OTHER SPECIFIED POSTPROCEDURAL STATES: Chronic | ICD-10-CM

## 2019-09-11 DIAGNOSIS — C50.919 MALIGNANT NEOPLASM OF UNSPECIFIED SITE OF UNSPECIFIED FEMALE BREAST: ICD-10-CM

## 2019-09-13 ENCOUNTER — RESULT REVIEW (OUTPATIENT)
Age: 74
End: 2019-09-13

## 2019-09-13 ENCOUNTER — APPOINTMENT (OUTPATIENT)
Dept: HEMATOLOGY ONCOLOGY | Facility: CLINIC | Age: 74
End: 2019-09-13
Payer: MEDICARE

## 2019-09-13 VITALS
OXYGEN SATURATION: 99 % | BODY MASS INDEX: 20.77 KG/M2 | TEMPERATURE: 98.6 F | WEIGHT: 113.54 LBS | DIASTOLIC BLOOD PRESSURE: 71 MMHG | SYSTOLIC BLOOD PRESSURE: 134 MMHG | HEART RATE: 74 BPM | RESPIRATION RATE: 18 BRPM

## 2019-09-13 LAB
BASOPHILS # BLD AUTO: 0 K/UL — SIGNIFICANT CHANGE UP (ref 0–0.2)
BASOPHILS NFR BLD AUTO: 0.6 % — SIGNIFICANT CHANGE UP (ref 0–2)
EOSINOPHIL # BLD AUTO: 0.1 K/UL — SIGNIFICANT CHANGE UP (ref 0–0.5)
EOSINOPHIL NFR BLD AUTO: 1.1 % — SIGNIFICANT CHANGE UP (ref 0–6)
HCT VFR BLD CALC: 46.5 % — HIGH (ref 34.5–45)
HGB BLD-MCNC: 13.6 G/DL — SIGNIFICANT CHANGE UP (ref 11.5–15.5)
LYMPHOCYTES # BLD AUTO: 1.8 K/UL — SIGNIFICANT CHANGE UP (ref 1–3.3)
LYMPHOCYTES # BLD AUTO: 21.1 % — SIGNIFICANT CHANGE UP (ref 13–44)
MCHC RBC-ENTMCNC: 27.6 PG — SIGNIFICANT CHANGE UP (ref 27–34)
MCHC RBC-ENTMCNC: 29.2 G/DL — LOW (ref 32–36)
MCV RBC AUTO: 94.5 FL — SIGNIFICANT CHANGE UP (ref 80–100)
MONOCYTES # BLD AUTO: 0.6 K/UL — SIGNIFICANT CHANGE UP (ref 0–0.9)
MONOCYTES NFR BLD AUTO: 7.4 % — SIGNIFICANT CHANGE UP (ref 2–14)
NEUTROPHILS # BLD AUTO: 6 K/UL — SIGNIFICANT CHANGE UP (ref 1.8–7.4)
NEUTROPHILS NFR BLD AUTO: 69.7 % — SIGNIFICANT CHANGE UP (ref 43–77)
PLATELET # BLD AUTO: 239 K/UL — SIGNIFICANT CHANGE UP (ref 150–400)
RBC # BLD: 4.92 M/UL — SIGNIFICANT CHANGE UP (ref 3.8–5.2)
RBC # FLD: 11.5 % — SIGNIFICANT CHANGE UP (ref 10.3–14.5)
WBC # BLD: 8.6 K/UL — SIGNIFICANT CHANGE UP (ref 3.8–10.5)
WBC # FLD AUTO: 8.6 K/UL — SIGNIFICANT CHANGE UP (ref 3.8–10.5)

## 2019-09-13 PROCEDURE — 99214 OFFICE O/P EST MOD 30 MIN: CPT

## 2019-09-13 NOTE — PHYSICAL EXAM
[Fully active, able to carry on all pre-disease performance without restriction] : Status 0 - Fully active, able to carry on all pre-disease performance without restriction [Normal] : affect appropriate [de-identified] : healed lumpectomy scar in the left breast.

## 2019-09-13 NOTE — HISTORY OF PRESENT ILLNESS
[8 - Distress Level] : Distress Level: 8 [Patient given social work contact information and resource sheet] : Patient was given social work contact information and resource sheet [0] : 0 [de-identified] : Ms. LAN CARO is a 72 year old female here for an evaluation of breast cancer. Her oncologic history is as follows:\par \par She underwent routine breast imaging on 4/17/17 which showed suspicious subcentimeter nodule in the left breast for which biopsy was recommended. She underwent left breast 11:00 ultrasound-guided core biopsy on 4/17/17 which showed atypical lobular hyperplasia. She underwent breast MRI on 5/12/17 which was read as BI-RADS 2\par \par She underwent excision with wire guided localization by Dr Filipe Pineda on 6/8/17 which showed intraductal carcinoma, grade 2, cribriform and micropapillary type with intermediate to low nuclear grade, 4 mm in greatest dimension. Intraductal carcinoma was 0.8 mm from the inked anterior margin. DCIS was strongly positive for ER and [place]. Focus of LCIS and atypical hyperplasia was noted.\par \par She seeked a second opinion from Dr. Hill. She underwent a breast MRI on 8/4/17 which showed a 2.1 cm area of enhancement in the upper outer quadrant of the left breast for which surgical excision was recommended.\par \par She underwent a left breast 11:00 core biopsy on 8/21/17 which showed ductal carcinoma in site 2, cribriform pattern with low grade nuclear atypia measured 1.5 mm with biopsy site changes.\par \par She underwent reexcision on 10/5/17 which showed a focus of atypical ductal hyperplasia and LCIS, classic type. Margins negative.\par \par Most recent bone density is from April 2017 which showed osteopenia with the lowest T score of -2.2 and the right femoral neck [de-identified] : Ms. 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 She has mild hot flashes. No aches/pain, no vag dryness, no excessive fatigue, no GI s/e. She is active, no change in energy, wt or appetite. Her cholesterol f/b PMD- well controlled\par mammogram with Dr Howard 5/2019 BIRADS 2\par DEXA- 05/2019- osteopenia(-1.9), on vit D. Takes no ca due to g/o high ben, reminded to take Qod\par  is a pt of Dr Posada, s/p haplo 4 yrs ago\par

## 2019-09-13 NOTE — CONSULT LETTER
[Dear  ___] : Dear  [unfilled], [Consult Letter:] : I had the pleasure of evaluating your patient, [unfilled]. [Please see my note below.] : Please see my note below. [Consult Closing:] : Thank you very much for allowing me to participate in the care of this patient.  If you have any questions, please do not hesitate to contact me. [Sincerely,] : Sincerely, [DrDipika  ___] : Dr. PATEL [FreeTextEntry3] : Angela Berry M.D.\par  of Medicine\par [facility]\par [facility] [place]\par [address] [place] [address]\par [address]\par Tele # [phone]; Fax [phone]

## 2019-09-13 NOTE — REASON FOR VISIT
[Follow-Up Visit] : a follow-up [Family Member] : family member [FreeTextEntry2] : DCIS ER/IA POSITIVE

## 2019-09-13 NOTE — ASSESSMENT
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 She is status post lumpectomy with negative margins. Her medical history is significant for hypercholesterolemia , osteopenia and hypothyroidism. She is otherwise very healthy, has a great performance status and is working part-time. She started anastrazole 11/2017\par \par - DCIS: Clinically, no evidence of disease. She is tolerating anastrozole very well without significant side effects.  Reports good compliance. Plan to continue AI for total of 5 years. She is up to date with breast imaging.\par - Osteopenia: concern for worsening bone density due to anastrozole. Rec to  continue vit D and start calcium Qod. DEXA 1-2 yrs. \par - Hypercalcemia in the past: now resolved. She will take calcium QOD and we will monitor levels\par - High cholesterol: concern for worsening cholesterol due to anastrozole. Pt is on zocor. Lipid profile annually. Lifestyle modifications reviewed- healthy eating and exercise\par - Mild hot flashes: 2/2 anastrozole. Advised to wear layers and use fan prn. Consider Effexor if get worse. Hot flashes resolved now\par \par RTC 6 m

## 2019-09-19 LAB
25(OH)D3 SERPL-MCNC: 37 NG/ML
ALBUMIN SERPL ELPH-MCNC: 5.2 G/DL
ALP BLD-CCNC: 65 U/L
ALT SERPL-CCNC: 23 U/L
ANION GAP SERPL CALC-SCNC: 14 MMOL/L
AST SERPL-CCNC: 26 U/L
BILIRUB SERPL-MCNC: 1 MG/DL
BUN SERPL-MCNC: 14 MG/DL
CALCIUM SERPL-MCNC: 10.4 MG/DL
CHLORIDE SERPL-SCNC: 103 MMOL/L
CO2 SERPL-SCNC: 25 MMOL/L
CREAT SERPL-MCNC: 0.83 MG/DL
GLUCOSE SERPL-MCNC: 101 MG/DL
POTASSIUM SERPL-SCNC: 4.3 MMOL/L
PROT SERPL-MCNC: 7.8 G/DL
SODIUM SERPL-SCNC: 142 MMOL/L

## 2020-03-16 ENCOUNTER — OUTPATIENT (OUTPATIENT)
Dept: OUTPATIENT SERVICES | Facility: HOSPITAL | Age: 75
LOS: 1 days | Discharge: HOME | End: 2020-03-16

## 2020-03-16 DIAGNOSIS — Z98.890 OTHER SPECIFIED POSTPROCEDURAL STATES: Chronic | ICD-10-CM

## 2020-03-16 DIAGNOSIS — C50.919 MALIGNANT NEOPLASM OF UNSPECIFIED SITE OF UNSPECIFIED FEMALE BREAST: ICD-10-CM

## 2020-03-20 ENCOUNTER — APPOINTMENT (OUTPATIENT)
Dept: HEMATOLOGY ONCOLOGY | Facility: CLINIC | Age: 75
End: 2020-03-20

## 2020-04-13 ENCOUNTER — OUTPATIENT (OUTPATIENT)
Dept: OUTPATIENT SERVICES | Facility: HOSPITAL | Age: 75
LOS: 1 days | Discharge: HOME | End: 2020-04-13

## 2020-04-13 DIAGNOSIS — C50.919 MALIGNANT NEOPLASM OF UNSPECIFIED SITE OF UNSPECIFIED FEMALE BREAST: ICD-10-CM

## 2020-04-13 DIAGNOSIS — Z98.890 OTHER SPECIFIED POSTPROCEDURAL STATES: Chronic | ICD-10-CM

## 2020-04-17 ENCOUNTER — APPOINTMENT (OUTPATIENT)
Dept: HEMATOLOGY ONCOLOGY | Facility: CLINIC | Age: 75
End: 2020-04-17
Payer: MEDICARE

## 2020-04-17 ENCOUNTER — APPOINTMENT (OUTPATIENT)
Dept: HEMATOLOGY ONCOLOGY | Facility: CLINIC | Age: 75
End: 2020-04-17

## 2020-04-17 PROCEDURE — 99214 OFFICE O/P EST MOD 30 MIN: CPT | Mod: 95

## 2020-04-28 NOTE — CONSULT LETTER
[Dear  ___] : Dear  [unfilled], [Consult Letter:] : I had the pleasure of evaluating your patient, [unfilled]. [Please see my note below.] : Please see my note below. [Consult Closing:] : Thank you very much for allowing me to participate in the care of this patient.  If you have any questions, please do not hesitate to contact me. [Sincerely,] : Sincerely, [DrDipika  ___] : Dr. PATEL [FreeTextEntry3] : Angela Berry M.D.\par  of Medicine\par [facility]\par [facility] [place]\par [address] [place] [address]\par [address]\par Tele # [phone]; Fax [phone]

## 2020-04-28 NOTE — REASON FOR VISIT
[Follow-Up Visit] : a follow-up [Family Member] : family member [FreeTextEntry2] : DCIS ER/ND POSITIVE

## 2020-04-28 NOTE — HISTORY OF PRESENT ILLNESS
[8 - Distress Level] : Distress Level: 8 [Patient given social work contact information and resource sheet] : Patient was given social work contact information and resource sheet [0] : 0 [Medical Office: ([facility])___] : at the medical office located in  [Home] : at home, [unfilled] , at the time of the visit. [Patient] : the patient [FreeTextEntry2] : Ms. LAN CARO [de-identified] : Ms. LAN CARO is a 72 year old female here for an evaluation of breast cancer. Her oncologic history is as follows:\par \par She underwent routine breast imaging on 4/17/17 which showed suspicious subcentimeter nodule in the left breast for which biopsy was recommended. She underwent left breast 11:00 ultrasound-guided core biopsy on 4/17/17 which showed atypical lobular hyperplasia. She underwent breast MRI on 5/12/17 which was read as BI-RADS 2\par \par She underwent excision with wire guided localization by Dr Filipe Pineda on 6/8/17 which showed intraductal carcinoma, grade 2, cribriform and micropapillary type with intermediate to low nuclear grade, 4 mm in greatest dimension. Intraductal carcinoma was 0.8 mm from the inked anterior margin. DCIS was strongly positive for ER and [place]. Focus of LCIS and atypical hyperplasia was noted.\par \par She seeked a second opinion from Dr. Hill. She underwent a breast MRI on 8/4/17 which showed a 2.1 cm area of enhancement in the upper outer quadrant of the left breast for which surgical excision was recommended.\par \par She underwent a left breast 11:00 core biopsy on 8/21/17 which showed ductal carcinoma in site 2, cribriform pattern with low grade nuclear atypia measured 1.5 mm with biopsy site changes.\par \par She underwent reexcision on 10/5/17 which showed a focus of atypical ductal hyperplasia and LCIS, classic type. Margins negative.\par \par Most recent bone density is from April 2017 which showed osteopenia with the lowest T score of -2.2 and the right femoral neck [de-identified] : Ms. 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 She has mild hot flashes. No aches/pain, no vag dryness, no excessive fatigue, no GI s/e. She is active, no change in energy, wt or appetite. Her cholesterol f/b PMD- well controlled\par mammogram with Dr Howard 5/2019 BIRADS 2\par DEXA- 05/2019- osteopenia(-1.9), on vit D. Takes no ca due to g/o high ben, reminded to take Qod\par  is a pt of Dr Posada, s/p haplo 4 yrs ago\par

## 2020-04-28 NOTE — ASSESSMENT
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 She is status post lumpectomy with negative margins. Her medical history is significant for hypercholesterolemia , osteopenia and hypothyroidism. She is otherwise very healthy, has a great performance status and is working part-time. She started anastrazole 11/2017\par \par - DCIS: Clinically, no evidence of disease. She is tolerating anastrozole very well without significant side effects.  Reports good compliance. Plan to continue AI for total of 5 years. She is up to date with breast imaging.\par - Osteopenia: concern for worsening bone density due to anastrozole. Rec to  continue vit D and start calcium Qod. DEXA 1-2 yrs. \par - Hypercalcemia in the past: now resolved. She will take calcium QOD and we will monitor levels\par - High cholesterol: concern for worsening cholesterol due to anastrozole. Pt is on zocor. Lipid profile annually. Lifestyle modifications reviewed- healthy eating and exercise\par - Mild hot flashes: 2/2 anastrozole. Advised to wear layers and use fan prn. Consider Effexor if get worse. Hot flashes resolved now\par \par RTC 6 m

## 2020-05-20 ENCOUNTER — OUTPATIENT (OUTPATIENT)
Dept: OUTPATIENT SERVICES | Facility: HOSPITAL | Age: 75
LOS: 1 days | Discharge: HOME | End: 2020-05-20

## 2020-05-20 DIAGNOSIS — Z98.890 OTHER SPECIFIED POSTPROCEDURAL STATES: Chronic | ICD-10-CM

## 2020-05-20 DIAGNOSIS — C50.919 MALIGNANT NEOPLASM OF UNSPECIFIED SITE OF UNSPECIFIED FEMALE BREAST: ICD-10-CM

## 2020-05-22 ENCOUNTER — RESULT REVIEW (OUTPATIENT)
Age: 75
End: 2020-05-22

## 2020-05-22 ENCOUNTER — APPOINTMENT (OUTPATIENT)
Dept: HEMATOLOGY ONCOLOGY | Facility: CLINIC | Age: 75
End: 2020-05-22

## 2020-05-22 LAB
BASOPHILS # BLD AUTO: 0.05 K/UL — SIGNIFICANT CHANGE UP (ref 0–0.2)
BASOPHILS NFR BLD AUTO: 0.6 % — SIGNIFICANT CHANGE UP (ref 0–2)
EOSINOPHIL # BLD AUTO: 0.08 K/UL — SIGNIFICANT CHANGE UP (ref 0–0.5)
EOSINOPHIL NFR BLD AUTO: 0.9 % — SIGNIFICANT CHANGE UP (ref 0–6)
HCT VFR BLD CALC: 43.7 % — SIGNIFICANT CHANGE UP (ref 34.5–45)
HGB BLD-MCNC: 14.3 G/DL — SIGNIFICANT CHANGE UP (ref 11.5–15.5)
IMM GRANULOCYTES NFR BLD AUTO: 0.7 % — SIGNIFICANT CHANGE UP (ref 0–1.5)
LYMPHOCYTES # BLD AUTO: 1.93 K/UL — SIGNIFICANT CHANGE UP (ref 1–3.3)
LYMPHOCYTES # BLD AUTO: 22.7 % — SIGNIFICANT CHANGE UP (ref 13–44)
MCHC RBC-ENTMCNC: 30.4 PG — SIGNIFICANT CHANGE UP (ref 27–34)
MCHC RBC-ENTMCNC: 32.7 GM/DL — SIGNIFICANT CHANGE UP (ref 32–36)
MCV RBC AUTO: 92.8 FL — SIGNIFICANT CHANGE UP (ref 80–100)
MONOCYTES # BLD AUTO: 0.76 K/UL — SIGNIFICANT CHANGE UP (ref 0–0.9)
MONOCYTES NFR BLD AUTO: 8.9 % — SIGNIFICANT CHANGE UP (ref 2–14)
NEUTROPHILS # BLD AUTO: 5.64 K/UL — SIGNIFICANT CHANGE UP (ref 1.8–7.4)
NEUTROPHILS NFR BLD AUTO: 66.2 % — SIGNIFICANT CHANGE UP (ref 43–77)
NRBC # BLD: 0 /100 WBCS — SIGNIFICANT CHANGE UP (ref 0–0)
PLATELET # BLD AUTO: 235 K/UL — SIGNIFICANT CHANGE UP (ref 150–400)
RBC # BLD: 4.71 M/UL — SIGNIFICANT CHANGE UP (ref 3.8–5.2)
RBC # FLD: 12.5 % — SIGNIFICANT CHANGE UP (ref 10.3–14.5)
WBC # BLD: 8.52 K/UL — SIGNIFICANT CHANGE UP (ref 3.8–10.5)
WBC # FLD AUTO: 8.52 K/UL — SIGNIFICANT CHANGE UP (ref 3.8–10.5)

## 2020-05-26 LAB
25(OH)D3 SERPL-MCNC: 42.3 NG/ML
ALBUMIN SERPL ELPH-MCNC: 5 G/DL
ALP BLD-CCNC: 56 U/L
ALT SERPL-CCNC: 15 U/L
ANION GAP SERPL CALC-SCNC: 12 MMOL/L
AST SERPL-CCNC: 17 U/L
BILIRUB SERPL-MCNC: 1 MG/DL
BUN SERPL-MCNC: 19 MG/DL
CALCIUM SERPL-MCNC: 10 MG/DL
CHLORIDE SERPL-SCNC: 101 MMOL/L
CO2 SERPL-SCNC: 27 MMOL/L
CREAT SERPL-MCNC: 0.82 MG/DL
FOLATE SERPL-MCNC: 17.9 NG/ML
GLUCOSE SERPL-MCNC: 112 MG/DL
POTASSIUM SERPL-SCNC: 4.4 MMOL/L
PROT SERPL-MCNC: 7.2 G/DL
SODIUM SERPL-SCNC: 140 MMOL/L
VIT B12 SERPL-MCNC: 491 PG/ML

## 2020-05-27 ENCOUNTER — APPOINTMENT (OUTPATIENT)
Dept: HEMATOLOGY ONCOLOGY | Facility: CLINIC | Age: 75
End: 2020-05-27
Payer: MEDICARE

## 2020-05-27 PROCEDURE — 99214 OFFICE O/P EST MOD 30 MIN: CPT | Mod: 95

## 2020-05-27 NOTE — HISTORY OF PRESENT ILLNESS
[8 - Distress Level] : Distress Level: 8 [Patient given social work contact information and resource sheet] : Patient was given social work contact information and resource sheet [Home] : at home, [unfilled] , at the time of the visit. [Medical Office: ([facility])___] : at the medical office located in  [Patient] : the patient [0] : 0 [FreeTextEntry2] : Ms. LAN CARO [de-identified] : Ms. LAN CARO is a 72 year old female here for an evaluation of breast cancer. Her oncologic history is as follows:\par \par She underwent routine breast imaging on 4/17/17 which showed suspicious subcentimeter nodule in the left breast for which biopsy was recommended. She underwent left breast 11:00 ultrasound-guided core biopsy on 4/17/17 which showed atypical lobular hyperplasia. She underwent breast MRI on 5/12/17 which was read as BI-RADS 2\par \par She underwent excision with wire guided localization by Dr Filipe Pineda on 6/8/17 which showed intraductal carcinoma, grade 2, cribriform and micropapillary type with intermediate to low nuclear grade, 4 mm in greatest dimension. Intraductal carcinoma was 0.8 mm from the inked anterior margin. DCIS was strongly positive for ER and [place]. Focus of LCIS and atypical hyperplasia was noted.\par \par She seeked a second opinion from Dr. Hill. She underwent a breast MRI on 8/4/17 which showed a 2.1 cm area of enhancement in the upper outer quadrant of the left breast for which surgical excision was recommended.\par \par She underwent a left breast 11:00 core biopsy on 8/21/17 which showed ductal carcinoma in site 2, cribriform pattern with low grade nuclear atypia measured 1.5 mm with biopsy site changes.\par \par She underwent reexcision on 10/5/17 which showed a focus of atypical ductal hyperplasia and LCIS, classic type. Margins negative.\par \par Most recent bone density is from April 2017 which showed osteopenia with the lowest T score of -2.2 and the right femoral neck [de-identified] : Ms. 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 She has mild hot flashes. No aches/pain, no vag dryness, no excessive fatigue, no GI s/e. She is active, no change in energy, wt or appetite. Her cholesterol f/b PMD- well controlled\par mammogram with Dr Howard 5/2019 BIRADS 2\par DEXA- 05/2019- osteopenia(-1.9), on vit D. Takes no ca due to g/o high ben, reminded to take Qod\par  is a pt of Dr Posada, s/p haplo 4 yrs ago\par SHE WAS SEEN LAST MONTH. She came last week for urgent BW as she has been feeling LE /UE weakness. She says its hard to explain. Its off/on. She denies difficulty walking or lifting issues. No sx at present. Reports similar episode 10 yrs ago and work up was normal. Ssx were self limiting at that time. She has BW last week. BW all normal. She saw PCP few weeks ago and all BW was normal thre as well. She will be referred to neuro. She has a neuro and will call herself for appt

## 2020-05-27 NOTE — ASSESSMENT
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 She is status post lumpectomy with negative margins. Her medical history is significant for hypercholesterolemia , osteopenia and hypothyroidism. She is otherwise very healthy, has a great performance status and is working part-time. She started anastrazole 11/2017\par \par - DCIS: Clinically, no evidence of disease. She is tolerating anastrozole very well without significant side effects.  Reports good compliance. Plan to continue AI for total of 5 years. She is up to date with breast imaging.\par - Osteopenia: concern for worsening bone density due to anastrozole. Rec to  continue vit D and start calcium Qod. DEXA 1-2 yrs. \par - Hypercalcemia in the past: now resolved. She will take calcium QOD and we will monitor levels\par - High cholesterol: concern for worsening cholesterol due to anastrozole. Pt is on zocor. Lipid profile annually. Lifestyle modifications reviewed- healthy eating and exercise\par - Mild hot flashes: 2/2 anastrozole. Advised to wear layers and use fan prn. Consider Effexor if get worse. Hot flashes resolved now\par - Vague neuro sx. Sx are off/on. Rec neuro f/u. Unlikely from malignancy or treatment. b12/FA normal\par \par RTC 6 m

## 2020-05-27 NOTE — CONSULT LETTER
[Dear  ___] : Dear  [unfilled], [Consult Letter:] : I had the pleasure of evaluating your patient, [unfilled]. [Please see my note below.] : Please see my note below. [Consult Closing:] : Thank you very much for allowing me to participate in the care of this patient.  If you have any questions, please do not hesitate to contact me. [Sincerely,] : Sincerely, [DrDipika  ___] : Dr. PATEL [FreeTextEntry3] : Angela Berry M.D.\par  of Medicine\par [facility]\par [facility] [place]\par [address] [place] [address]\par [address]\par Tele # [phone]; Fax [phone]

## 2020-05-27 NOTE — PHYSICAL EXAM
[Fully active, able to carry on all pre-disease performance without restriction] : Status 0 - Fully active, able to carry on all pre-disease performance without restriction [de-identified] : healed lumpectomy scar in the left breast.  [de-identified] : \par Constitutional: well developed, well nourished, in no acute distress. \par Eyes: no icterus seen. no conjunctival injection\par ENT: no tongue swelling, no nasal discharge\par Neck: no visible masses or goitre \par Pulmonary: no audible wheeze,  respirations unlabored\par Cardiovascular: no edema\par Abdomen: no distension\par Musculoskeletal: full range of motion, walking in the house\par Skin: no rash visible on face or upper chest\par Neurology: grossly intact. \par PSychiatric: affect appropriate. \par

## 2020-09-25 ENCOUNTER — RESULT REVIEW (OUTPATIENT)
Age: 75
End: 2020-09-25

## 2020-09-25 ENCOUNTER — APPOINTMENT (OUTPATIENT)
Dept: ULTRASOUND IMAGING | Facility: IMAGING CENTER | Age: 75
End: 2020-09-25
Payer: MEDICARE

## 2020-09-25 ENCOUNTER — OUTPATIENT (OUTPATIENT)
Dept: OUTPATIENT SERVICES | Facility: HOSPITAL | Age: 75
LOS: 1 days | End: 2020-09-25
Payer: MEDICARE

## 2020-09-25 ENCOUNTER — APPOINTMENT (OUTPATIENT)
Dept: MAMMOGRAPHY | Facility: IMAGING CENTER | Age: 75
End: 2020-09-25
Payer: MEDICARE

## 2020-09-25 DIAGNOSIS — Z00.8 ENCOUNTER FOR OTHER GENERAL EXAMINATION: ICD-10-CM

## 2020-09-25 DIAGNOSIS — Z98.890 OTHER SPECIFIED POSTPROCEDURAL STATES: Chronic | ICD-10-CM

## 2020-09-25 PROCEDURE — G0279: CPT

## 2020-09-25 PROCEDURE — 77066 DX MAMMO INCL CAD BI: CPT | Mod: 26

## 2020-09-25 PROCEDURE — G0279: CPT | Mod: 26

## 2020-09-25 PROCEDURE — 76641 ULTRASOUND BREAST COMPLETE: CPT | Mod: 26,50

## 2020-09-25 PROCEDURE — 77066 DX MAMMO INCL CAD BI: CPT

## 2020-09-25 PROCEDURE — 76641 ULTRASOUND BREAST COMPLETE: CPT

## 2020-10-28 ENCOUNTER — OUTPATIENT (OUTPATIENT)
Dept: OUTPATIENT SERVICES | Facility: HOSPITAL | Age: 75
LOS: 1 days | Discharge: HOME | End: 2020-10-28

## 2020-10-28 DIAGNOSIS — Z98.890 OTHER SPECIFIED POSTPROCEDURAL STATES: Chronic | ICD-10-CM

## 2020-10-28 DIAGNOSIS — C50.919 MALIGNANT NEOPLASM OF UNSPECIFIED SITE OF UNSPECIFIED FEMALE BREAST: ICD-10-CM

## 2020-11-04 ENCOUNTER — APPOINTMENT (OUTPATIENT)
Dept: HEMATOLOGY ONCOLOGY | Facility: CLINIC | Age: 75
End: 2020-11-04
Payer: MEDICARE

## 2020-11-04 PROCEDURE — 99213 OFFICE O/P EST LOW 20 MIN: CPT | Mod: 95

## 2020-11-04 NOTE — HISTORY OF PRESENT ILLNESS
[Home] : at home, [unfilled] , at the time of the visit. [Medical Office: ([facility])___] : at the medical office located in  [Patient] : the patient [8 - Distress Level] : Distress Level: 8 [Patient given social work contact information and resource sheet] : Patient was given social work contact information and resource sheet [0] : 0 [FreeTextEntry2] : Ms. LAN CARO [T: ___] : T[unfilled] [N: ___] : N[unfilled] [M: ___] : M[unfilled] [AJCC Stage: ____] : AJCC Stage: [unfilled] [de-identified] : Ms. LAN CARO is a 72 year old female here for an evaluation of breast cancer. Her oncologic history is as follows:\par \par She underwent routine breast imaging on 4/17/17 which showed suspicious subcentimeter nodule in the left breast for which biopsy was recommended. She underwent left breast 11:00 ultrasound-guided core biopsy on 4/17/17 which showed atypical lobular hyperplasia. She underwent breast MRI on 5/12/17 which was read as BI-RADS 2\par \par She underwent excision with wire guided localization by Dr Filipe Pineda on 6/8/17 which showed intraductal carcinoma, grade 2, cribriform and micropapillary type with intermediate to low nuclear grade, 4 mm in greatest dimension. Intraductal carcinoma was 0.8 mm from the inked anterior margin. DCIS was strongly positive for ER and [place]. Focus of LCIS and atypical hyperplasia was noted.\par \par She seeked a second opinion from Dr. Hill. She underwent a breast MRI on 8/4/17 which showed a 2.1 cm area of enhancement in the upper outer quadrant of the left breast for which surgical excision was recommended.\par \par She underwent a left breast 11:00 core biopsy on 8/21/17 which showed ductal carcinoma in site 2, cribriform pattern with low grade nuclear atypia measured 1.5 mm with biopsy site changes.\par \par She underwent reexcision on 10/5/17 which showed a focus of atypical ductal hyperplasia and LCIS, classic type. Margins negative.\par \par Most recent bone density is from April 2017 which showed osteopenia with the lowest T score of -2.2 and the right femoral neck [de-identified] : Ms. 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 She has mild hot flashes. No aches/pain, no vag dryness, no excessive fatigue, no GI s/e. She is active, no change in energy, wt or appetite. Her cholesterol f/b PMD- well controlled\par mammogram with Dr Howard 9/2020 BIRADS 2\par DEXA- 05/2019- osteopenia(-1.9), on vit D. Takes no ca due to g/o high ben, reminded to take Qod\par  is a pt of Dr Posada, s/p haplo 4 yrs ago\par She is in quarantine so that she can go see her new grand daughter, hence tele visit today\par Neuro sx resolved. She saw a neuro and work up was negative

## 2020-11-04 NOTE — ASSESSMENT
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 She is status post lumpectomy with negative margins. Her medical history is significant for hypercholesterolemia , osteopenia and hypothyroidism. She is otherwise very healthy, has a great performance status and is working part-time. She started anastrazole 11/2017\par \par - DCIS: Clinically, no evidence of disease. She is tolerating anastrozole very well without significant side effects.  Reports good compliance. Plan to continue AI for total of 5 years. She is up to date with breast imaging.\par - Osteopenia: concern for worsening bone density due to anastrozole. Rec to  continue vit D and start calcium Qod. DEXA 1-2 yrs. \par - Hypercalcemia in the past: now resolved. She will take calcium QOD and we will monitor levels\par - High cholesterol: concern for worsening cholesterol due to anastrozole. Pt is on zocor. Lipid profile annually. Lifestyle modifications reviewed- healthy eating and exercise\par - Mild hot flashes: 2/2 anastrozole. Advised to wear layers and use fan prn. Consider Effexor if get worse. Hot flashes resolved now\par \par RTC 6 m

## 2020-11-04 NOTE — PHYSICAL EXAM
[Fully active, able to carry on all pre-disease performance without restriction] : Status 0 - Fully active, able to carry on all pre-disease performance without restriction [de-identified] : \par Constitutional: well developed, well nourished, in no acute distress. \par Eyes: no icterus seen. no conjunctival injection\par ENT: no tongue swelling, no nasal discharge\par Neck: no visible masses or goitre \par Pulmonary: no audible wheeze,  respirations unlabored\par Cardiovascular: no edema\par Abdomen: no distension\par Musculoskeletal: full range of motion, walking in the house\par Skin: no rash visible on face or upper chest\par Neurology: grossly intact. \par PSychiatric: affect appropriate. \par  [de-identified] : healed lumpectomy scar in the left breast.

## 2020-11-04 NOTE — CONSULT LETTER
[Dear  ___] : Dear  [unfilled], [Consult Letter:] : I had the pleasure of evaluating your patient, [unfilled]. [Please see my note below.] : Please see my note below. [Consult Closing:] : Thank you very much for allowing me to participate in the care of this patient.  If you have any questions, please do not hesitate to contact me. [Sincerely,] : Sincerely, [DrDipika  ___] : Dr. PATEL [FreeTextEntry3] : Angela Berry M.D.\par  of Medicine\par [facility]\par [facility] [place]\par [address] [place] [address]\par [address]\par Tele # [phone]; Fax [phone]

## 2021-05-17 ENCOUNTER — OUTPATIENT (OUTPATIENT)
Dept: OUTPATIENT SERVICES | Facility: HOSPITAL | Age: 76
LOS: 1 days | Discharge: HOME | End: 2021-05-17

## 2021-05-17 DIAGNOSIS — Z98.890 OTHER SPECIFIED POSTPROCEDURAL STATES: Chronic | ICD-10-CM

## 2021-05-17 DIAGNOSIS — C50.919 MALIGNANT NEOPLASM OF UNSPECIFIED SITE OF UNSPECIFIED FEMALE BREAST: ICD-10-CM

## 2021-07-21 ENCOUNTER — OUTPATIENT (OUTPATIENT)
Dept: OUTPATIENT SERVICES | Facility: HOSPITAL | Age: 76
LOS: 1 days | Discharge: HOME | End: 2021-07-21

## 2021-07-21 DIAGNOSIS — Z98.890 OTHER SPECIFIED POSTPROCEDURAL STATES: Chronic | ICD-10-CM

## 2021-07-21 DIAGNOSIS — C50.919 MALIGNANT NEOPLASM OF UNSPECIFIED SITE OF UNSPECIFIED FEMALE BREAST: ICD-10-CM

## 2021-09-29 ENCOUNTER — OUTPATIENT (OUTPATIENT)
Dept: OUTPATIENT SERVICES | Facility: HOSPITAL | Age: 76
LOS: 1 days | Discharge: HOME | End: 2021-09-29

## 2021-09-29 DIAGNOSIS — C50.919 MALIGNANT NEOPLASM OF UNSPECIFIED SITE OF UNSPECIFIED FEMALE BREAST: ICD-10-CM

## 2021-09-29 DIAGNOSIS — Z98.890 OTHER SPECIFIED POSTPROCEDURAL STATES: Chronic | ICD-10-CM

## 2021-10-01 ENCOUNTER — RESULT REVIEW (OUTPATIENT)
Age: 76
End: 2021-10-01

## 2021-10-01 ENCOUNTER — APPOINTMENT (OUTPATIENT)
Dept: HEMATOLOGY ONCOLOGY | Facility: CLINIC | Age: 76
End: 2021-10-01
Payer: MEDICARE

## 2021-10-01 VITALS
DIASTOLIC BLOOD PRESSURE: 77 MMHG | RESPIRATION RATE: 16 BRPM | HEIGHT: 62.01 IN | HEART RATE: 83 BPM | TEMPERATURE: 99 F | OXYGEN SATURATION: 98 % | SYSTOLIC BLOOD PRESSURE: 148 MMHG | WEIGHT: 112.44 LBS | BODY MASS INDEX: 20.43 KG/M2

## 2021-10-01 DIAGNOSIS — E83.52 HYPERCALCEMIA: ICD-10-CM

## 2021-10-01 LAB
BASOPHILS # BLD AUTO: 0.04 K/UL — SIGNIFICANT CHANGE UP (ref 0–0.2)
BASOPHILS NFR BLD AUTO: 0.5 % — SIGNIFICANT CHANGE UP (ref 0–2)
EOSINOPHIL # BLD AUTO: 0.11 K/UL — SIGNIFICANT CHANGE UP (ref 0–0.5)
EOSINOPHIL NFR BLD AUTO: 1.3 % — SIGNIFICANT CHANGE UP (ref 0–6)
HCT VFR BLD CALC: 42.9 % — SIGNIFICANT CHANGE UP (ref 34.5–45)
HGB BLD-MCNC: 13.9 G/DL — SIGNIFICANT CHANGE UP (ref 11.5–15.5)
IMM GRANULOCYTES NFR BLD AUTO: 0.6 % — SIGNIFICANT CHANGE UP (ref 0–1.5)
LYMPHOCYTES # BLD AUTO: 2.1 K/UL — SIGNIFICANT CHANGE UP (ref 1–3.3)
LYMPHOCYTES # BLD AUTO: 24.5 % — SIGNIFICANT CHANGE UP (ref 13–44)
MCHC RBC-ENTMCNC: 30.5 PG — SIGNIFICANT CHANGE UP (ref 27–34)
MCHC RBC-ENTMCNC: 32.4 G/DL — SIGNIFICANT CHANGE UP (ref 32–36)
MCV RBC AUTO: 94.3 FL — SIGNIFICANT CHANGE UP (ref 80–100)
MONOCYTES # BLD AUTO: 0.72 K/UL — SIGNIFICANT CHANGE UP (ref 0–0.9)
MONOCYTES NFR BLD AUTO: 8.4 % — SIGNIFICANT CHANGE UP (ref 2–14)
NEUTROPHILS # BLD AUTO: 5.56 K/UL — SIGNIFICANT CHANGE UP (ref 1.8–7.4)
NEUTROPHILS NFR BLD AUTO: 64.7 % — SIGNIFICANT CHANGE UP (ref 43–77)
NRBC # BLD: 0 /100 WBCS — SIGNIFICANT CHANGE UP (ref 0–0)
PLATELET # BLD AUTO: 211 K/UL — SIGNIFICANT CHANGE UP (ref 150–400)
RBC # BLD: 4.55 M/UL — SIGNIFICANT CHANGE UP (ref 3.8–5.2)
RBC # FLD: 12.5 % — SIGNIFICANT CHANGE UP (ref 10.3–14.5)
WBC # BLD: 8.58 K/UL — SIGNIFICANT CHANGE UP (ref 3.8–10.5)
WBC # FLD AUTO: 8.58 K/UL — SIGNIFICANT CHANGE UP (ref 3.8–10.5)

## 2021-10-01 PROCEDURE — 99214 OFFICE O/P EST MOD 30 MIN: CPT

## 2021-10-01 NOTE — CONSULT LETTER
[Dear  ___] : Dear  [unfilled], [Consult Letter:] : I had the pleasure of evaluating your patient, [unfilled]. [Please see my note below.] : Please see my note below. [Consult Closing:] : Thank you very much for allowing me to participate in the care of this patient.  If you have any questions, please do not hesitate to contact me. [Sincerely,] : Sincerely, [DrDipika  ___] : Dr. PATEL [FreeTextEntry3] : Angela Berry M.D.\par  of Medicine\par [facility]\par [facility]\par [address] [place] [address]\par [address]\par Tele # [phone]; Fax [phone]

## 2021-10-01 NOTE — HISTORY OF PRESENT ILLNESS
[T: ___] : T[unfilled] [N: ___] : N[unfilled] [M: ___] : M[unfilled] [AJCC Stage: ____] : AJCC Stage: [unfilled] [8 - Distress Level] : Distress Level: 8 [Patient given social work contact information and resource sheet] : Patient was given social work contact information and resource sheet [0] : 0 [de-identified] : Ms. LAN CARO is a 72 year old female here for an evaluation of breast cancer. Her oncologic history is as follows:\par \par She underwent routine breast imaging on 4/17/17 which showed suspicious subcentimeter nodule in the left breast for which biopsy was recommended. She underwent left breast 11:00 ultrasound-guided core biopsy on 4/17/17 which showed atypical lobular hyperplasia. She underwent breast MRI on 5/12/17 which was read as BI-RADS 2\par \par She underwent excision with wire guided localization by Dr Filipe Pineda on 6/8/17 which showed intraductal carcinoma, grade 2, cribriform and micropapillary type with intermediate to low nuclear grade, 4 mm in greatest dimension. Intraductal carcinoma was 0.8 mm from the inked anterior margin. DCIS was strongly positive for ER and [place]. Focus of LCIS and atypical hyperplasia was noted.\par \par She seeked a second opinion from Dr. Hill. She underwent a breast MRI on 8/4/17 which showed a 2.1 cm area of enhancement in the upper outer quadrant of the left breast for which surgical excision was recommended.\par \par She underwent a left breast 11:00 core biopsy on 8/21/17 which showed ductal carcinoma in site 2, cribriform pattern with low grade nuclear atypia measured 1.5 mm with biopsy site changes.\par \par She underwent reexcision on 10/5/17 which showed a focus of atypical ductal hyperplasia and LCIS, classic type. Margins negative.\par \par Most recent bone density is from April 2017 which showed osteopenia with the lowest T score of -2.2 and the right femoral neck [de-identified] : Ms. 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 She has mild hot flashes. No aches/pain, no vag dryness, no excessive fatigue, no GI s/e. She is active, no change in energy, wt or appetite. Her cholesterol f/b PMD- well controlled\par mammogram with Dr Howard 9/2020 BIRADS 2\par DEXA- 05/2019- osteopenia(-1.9), on vit D. Takes no ca due to g/o high ben, reminded to take Qod\par  is a pt of Dr Posada, s/p haplo 4 yrs ago\par s/p moderna x 3

## 2021-10-01 NOTE — ASSESSMENT
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 She is status post lumpectomy with negative margins. Her medical history is significant for hypercholesterolemia , osteopenia and hypothyroidism. She is otherwise very healthy, has a great performance status and is working part-time. She started anastrazole 11/2017\par \par - DCIS: Clinically, no evidence of disease. She is tolerating anastrozole very well without significant side effects.  Reports good compliance. Plan to continue AI for total of 5 years. She is up to date with breast imaging.\par - Osteopenia: concern for worsening bone density due to anastrozole. Rec to  continue vit D and start calcium Qod. DEXA 1-2 yrs. \par - Hypercalcemia in the past: now resolved. She will take calcium QOD and we will monitor levels\par - High cholesterol: concern for worsening cholesterol due to anastrozole. Pt is on zocor. Lipid profile annually. Lifestyle modifications reviewed- healthy eating and exercise\par - Mild hot flashes: 2/2 anastrozole. Advised to wear layers and use fan prn. Consider Effexor if get worse. Hot flashes resolved now\par \par RTC 6 m

## 2021-10-01 NOTE — PHYSICAL EXAM
[Fully active, able to carry on all pre-disease performance without restriction] : Status 0 - Fully active, able to carry on all pre-disease performance without restriction [Normal] : affect appropriate [de-identified] : healed lumpectomy scar in the left breast.  [de-identified] : \par Constitutional: well developed, well nourished, in no acute distress. \par Eyes: no icterus seen. no conjunctival injection\par ENT: no tongue swelling, no nasal discharge\par Neck: no visible masses or goitre \par Pulmonary: no audible wheeze,  respirations unlabored\par Cardiovascular: no edema\par Abdomen: no distension\par Musculoskeletal: full range of motion, walking in the house\par Skin: no rash visible on face or upper chest\par Neurology: grossly intact. \par PSychiatric: affect appropriate. \par

## 2021-10-05 LAB
25(OH)D3 SERPL-MCNC: 48.6 NG/ML
ALBUMIN SERPL ELPH-MCNC: 4.7 G/DL
ALP BLD-CCNC: 68 U/L
ALT SERPL-CCNC: 18 U/L
ANION GAP SERPL CALC-SCNC: 14 MMOL/L
AST SERPL-CCNC: 23 U/L
BILIRUB SERPL-MCNC: 0.9 MG/DL
BUN SERPL-MCNC: 17 MG/DL
CALCIUM SERPL-MCNC: 10.1 MG/DL
CHLORIDE SERPL-SCNC: 106 MMOL/L
CO2 SERPL-SCNC: 22 MMOL/L
CREAT SERPL-MCNC: 0.82 MG/DL
GLUCOSE SERPL-MCNC: 111 MG/DL
POTASSIUM SERPL-SCNC: 4.6 MMOL/L
PROT SERPL-MCNC: 7.2 G/DL
SODIUM SERPL-SCNC: 143 MMOL/L

## 2021-11-08 ENCOUNTER — APPOINTMENT (OUTPATIENT)
Dept: MAMMOGRAPHY | Facility: IMAGING CENTER | Age: 76
End: 2021-11-08
Payer: MEDICARE

## 2021-11-08 ENCOUNTER — APPOINTMENT (OUTPATIENT)
Dept: RADIOLOGY | Facility: IMAGING CENTER | Age: 76
End: 2021-11-08
Payer: MEDICARE

## 2021-11-08 ENCOUNTER — OUTPATIENT (OUTPATIENT)
Dept: OUTPATIENT SERVICES | Facility: HOSPITAL | Age: 76
LOS: 1 days | End: 2021-11-08
Payer: MEDICARE

## 2021-11-08 ENCOUNTER — APPOINTMENT (OUTPATIENT)
Dept: ULTRASOUND IMAGING | Facility: IMAGING CENTER | Age: 76
End: 2021-11-08
Payer: MEDICARE

## 2021-11-08 ENCOUNTER — RESULT REVIEW (OUTPATIENT)
Age: 76
End: 2021-11-08

## 2021-11-08 DIAGNOSIS — Z00.8 ENCOUNTER FOR OTHER GENERAL EXAMINATION: ICD-10-CM

## 2021-11-08 DIAGNOSIS — Z98.890 OTHER SPECIFIED POSTPROCEDURAL STATES: Chronic | ICD-10-CM

## 2021-11-08 PROCEDURE — 76641 ULTRASOUND BREAST COMPLETE: CPT | Mod: 26,50

## 2021-11-08 PROCEDURE — G0279: CPT | Mod: 26

## 2021-11-08 PROCEDURE — 77066 DX MAMMO INCL CAD BI: CPT | Mod: 26

## 2021-11-08 PROCEDURE — 77080 DXA BONE DENSITY AXIAL: CPT | Mod: 26

## 2021-11-08 PROCEDURE — 77080 DXA BONE DENSITY AXIAL: CPT

## 2021-11-08 PROCEDURE — 76641 ULTRASOUND BREAST COMPLETE: CPT

## 2021-11-08 PROCEDURE — G0279: CPT

## 2021-11-08 PROCEDURE — 77066 DX MAMMO INCL CAD BI: CPT

## 2021-11-27 ENCOUNTER — OUTPATIENT (OUTPATIENT)
Dept: OUTPATIENT SERVICES | Facility: HOSPITAL | Age: 76
LOS: 1 days | Discharge: HOME | End: 2021-11-27

## 2021-11-27 DIAGNOSIS — Z98.890 OTHER SPECIFIED POSTPROCEDURAL STATES: Chronic | ICD-10-CM

## 2021-11-27 DIAGNOSIS — C50.919 MALIGNANT NEOPLASM OF UNSPECIFIED SITE OF UNSPECIFIED FEMALE BREAST: ICD-10-CM

## 2021-11-30 ENCOUNTER — APPOINTMENT (OUTPATIENT)
Dept: INFUSION THERAPY | Facility: HOSPITAL | Age: 76
End: 2021-11-30

## 2021-12-01 DIAGNOSIS — C79.51 SECONDARY MALIGNANT NEOPLASM OF BONE: ICD-10-CM

## 2021-12-06 ENCOUNTER — NON-APPOINTMENT (OUTPATIENT)
Age: 76
End: 2021-12-06

## 2022-03-31 ENCOUNTER — OUTPATIENT (OUTPATIENT)
Dept: OUTPATIENT SERVICES | Facility: HOSPITAL | Age: 77
LOS: 1 days | Discharge: HOME | End: 2022-03-31

## 2022-03-31 DIAGNOSIS — C50.919 MALIGNANT NEOPLASM OF UNSPECIFIED SITE OF UNSPECIFIED FEMALE BREAST: ICD-10-CM

## 2022-03-31 DIAGNOSIS — Z98.890 OTHER SPECIFIED POSTPROCEDURAL STATES: Chronic | ICD-10-CM

## 2022-04-04 ENCOUNTER — RESULT REVIEW (OUTPATIENT)
Age: 77
End: 2022-04-04

## 2022-04-04 ENCOUNTER — APPOINTMENT (OUTPATIENT)
Dept: HEMATOLOGY ONCOLOGY | Facility: CLINIC | Age: 77
End: 2022-04-04
Payer: MEDICARE

## 2022-04-04 VITALS
HEART RATE: 75 BPM | OXYGEN SATURATION: 98 % | HEIGHT: 62.01 IN | WEIGHT: 106.9 LBS | TEMPERATURE: 98.2 F | RESPIRATION RATE: 16 BRPM | BODY MASS INDEX: 19.42 KG/M2 | SYSTOLIC BLOOD PRESSURE: 145 MMHG | DIASTOLIC BLOOD PRESSURE: 76 MMHG

## 2022-04-04 DIAGNOSIS — E78.00 PURE HYPERCHOLESTEROLEMIA, UNSPECIFIED: ICD-10-CM

## 2022-04-04 DIAGNOSIS — M85.80 OTHER SPECIFIED DISORDERS OF BONE DENSITY AND STRUCTURE, UNSPECIFIED SITE: ICD-10-CM

## 2022-04-04 LAB
BASOPHILS # BLD AUTO: 0.07 K/UL — SIGNIFICANT CHANGE UP (ref 0–0.2)
BASOPHILS NFR BLD AUTO: 0.9 % — SIGNIFICANT CHANGE UP (ref 0–2)
EOSINOPHIL # BLD AUTO: 0.1 K/UL — SIGNIFICANT CHANGE UP (ref 0–0.5)
EOSINOPHIL NFR BLD AUTO: 1.2 % — SIGNIFICANT CHANGE UP (ref 0–6)
HCT VFR BLD CALC: 41.5 % — SIGNIFICANT CHANGE UP (ref 34.5–45)
HGB BLD-MCNC: 13.5 G/DL — SIGNIFICANT CHANGE UP (ref 11.5–15.5)
IMM GRANULOCYTES NFR BLD AUTO: 1.1 % — SIGNIFICANT CHANGE UP (ref 0–1.5)
LYMPHOCYTES # BLD AUTO: 1.9 K/UL — SIGNIFICANT CHANGE UP (ref 1–3.3)
LYMPHOCYTES # BLD AUTO: 23.2 % — SIGNIFICANT CHANGE UP (ref 13–44)
MCHC RBC-ENTMCNC: 30.9 PG — SIGNIFICANT CHANGE UP (ref 27–34)
MCHC RBC-ENTMCNC: 32.5 G/DL — SIGNIFICANT CHANGE UP (ref 32–36)
MCV RBC AUTO: 95 FL — SIGNIFICANT CHANGE UP (ref 80–100)
MONOCYTES # BLD AUTO: 0.75 K/UL — SIGNIFICANT CHANGE UP (ref 0–0.9)
MONOCYTES NFR BLD AUTO: 9.2 % — SIGNIFICANT CHANGE UP (ref 2–14)
NEUTROPHILS # BLD AUTO: 5.28 K/UL — SIGNIFICANT CHANGE UP (ref 1.8–7.4)
NEUTROPHILS NFR BLD AUTO: 64.4 % — SIGNIFICANT CHANGE UP (ref 43–77)
NRBC # BLD: 0 /100 WBCS — SIGNIFICANT CHANGE UP (ref 0–0)
PLATELET # BLD AUTO: 254 K/UL — SIGNIFICANT CHANGE UP (ref 150–400)
RBC # BLD: 4.37 M/UL — SIGNIFICANT CHANGE UP (ref 3.8–5.2)
RBC # FLD: 12.3 % — SIGNIFICANT CHANGE UP (ref 10.3–14.5)
WBC # BLD: 8.19 K/UL — SIGNIFICANT CHANGE UP (ref 3.8–10.5)
WBC # FLD AUTO: 8.19 K/UL — SIGNIFICANT CHANGE UP (ref 3.8–10.5)

## 2022-04-04 PROCEDURE — 99214 OFFICE O/P EST MOD 30 MIN: CPT

## 2022-04-05 LAB
25(OH)D3 SERPL-MCNC: 51.7 NG/ML
ALBUMIN SERPL ELPH-MCNC: 4.6 G/DL
ALP BLD-CCNC: 42 U/L
ALT SERPL-CCNC: 14 U/L
ANION GAP SERPL CALC-SCNC: 12 MMOL/L
AST SERPL-CCNC: 21 U/L
BILIRUB SERPL-MCNC: 0.5 MG/DL
BUN SERPL-MCNC: 13 MG/DL
CALCIUM SERPL-MCNC: 9.5 MG/DL
CHLORIDE SERPL-SCNC: 105 MMOL/L
CO2 SERPL-SCNC: 25 MMOL/L
CREAT SERPL-MCNC: 0.8 MG/DL
EGFR: 76 ML/MIN/1.73M2
GLUCOSE SERPL-MCNC: 104 MG/DL
POTASSIUM SERPL-SCNC: 4 MMOL/L
PROT SERPL-MCNC: 6.7 G/DL
SODIUM SERPL-SCNC: 142 MMOL/L

## 2022-04-05 NOTE — ASSESSMENT
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 She is status post lumpectomy with negative margins. Her medical history is significant for hypercholesterolemia , osteopenia and hypothyroidism. She is otherwise very healthy, has a great performance status and is working part-time. She started anastrazole 11/2017\par \par - DCIS: Clinically, no evidence of disease. She is tolerating anastrozole very well without significant side effects.  Reports good compliance. Plan to continue AI for total of 5 years. She is up to date with breast imaging.\par She will complete 5 yrs in 11/2022\par - Osteoporosis: concern for worsening bone density due to anastrozole. Rec to  continue vit D and start calcium Qod. DEXA 1-2 yrs. DEXA 11/2021 osteoporosis. She has started prolia. Monitor for ONJ and hypocalcemia. Dental PRN\par - Hypercalcemia in the past: now resolved. She will take calcium QOD and we will monitor levels\par - High cholesterol: concern for worsening cholesterol due to anastrozole. Pt is on zocor. Lipid profile annually. Lifestyle modifications reviewed- healthy eating and exercise\par - Mild hot flashes: 2/2 anastrozole. Advised to wear layers and use fan prn. Consider Effexor if get worse. Hot flashes resolved now\par \par RTC 6 m

## 2022-04-05 NOTE — HISTORY OF PRESENT ILLNESS
[T: ___] : T[unfilled] [N: ___] : N[unfilled] [M: ___] : M[unfilled] [AJCC Stage: ____] : AJCC Stage: [unfilled] [8 - Distress Level] : Distress Level: 8 [Patient given social work contact information and resource sheet] : Patient was given social work contact information and resource sheet [0] : 0 [de-identified] : Ms. LAN CARO is a 72 year old female here for an evaluation of breast cancer. Her oncologic history is as follows:\par \par She underwent routine breast imaging on 4/17/17 which showed suspicious subcentimeter nodule in the left breast for which biopsy was recommended. She underwent left breast 11:00 ultrasound-guided core biopsy on 4/17/17 which showed atypical lobular hyperplasia. She underwent breast MRI on 5/12/17 which was read as BI-RADS 2\par \par She underwent excision with wire guided localization by Dr Filipe Pineda on 6/8/17 which showed intraductal carcinoma, grade 2, cribriform and micropapillary type with intermediate to low nuclear grade, 4 mm in greatest dimension. Intraductal carcinoma was 0.8 mm from the inked anterior margin. DCIS was strongly positive for ER and [place]. Focus of LCIS and atypical hyperplasia was noted.\par \par She seeked a second opinion from Dr. Hill. She underwent a breast MRI on 8/4/17 which showed a 2.1 cm area of enhancement in the upper outer quadrant of the left breast for which surgical excision was recommended.\par \par She underwent a left breast 11:00 core biopsy on 8/21/17 which showed ductal carcinoma in site 2, cribriform pattern with low grade nuclear atypia measured 1.5 mm with biopsy site changes.\par \par She underwent reexcision on 10/5/17 which showed a focus of atypical ductal hyperplasia and LCIS, classic type. Margins negative.\par \par Most recent bone density is from April 2017 which showed osteopenia with the lowest T score of -2.2 and the right femoral neck [de-identified] : Ms. 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 She has mild hot flashes. No aches/pain, no vag dryness, no excessive fatigue, no GI s/e. She is active, no change in energy, wt or appetite. Her cholesterol f/b PMD- well controlled\par mammogram with Dr Howard 9/2020, 11/2021, BIRADS 2\par DEXA- 05/2019- osteopenia(-1.9), 11/2021 osteoporosis. prolia started 11/2021. Rec to take ca+vit D, no dental issues, seeing regularly\par  is a pt of Dr Posada, s/p haplo 4 yrs ago\par s/p moderna x 3\par She will complete 5 yrs in 11/2022

## 2022-04-05 NOTE — CONSULT LETTER
[Dear  ___] : Dear  [unfilled], [Consult Letter:] : I had the pleasure of evaluating your patient, [unfilled]. [Please see my note below.] : Please see my note below. [Consult Closing:] : Thank you very much for allowing me to participate in the care of this patient.  If you have any questions, please do not hesitate to contact me. [Sincerely,] : Sincerely, [DrDipika  ___] : Dr. PATEL [FreeTextEntry3] : Angela Berry M.D.\par  of Medicine\par [facility]\par [facility] [place]\par [address] [place] [address]\par [address]\par Tele # [phone]; Fax [phone]

## 2022-04-05 NOTE — PHYSICAL EXAM
[Fully active, able to carry on all pre-disease performance without restriction] : Status 0 - Fully active, able to carry on all pre-disease performance without restriction [Normal] : affect appropriate [de-identified] : \par Constitutional: well developed, well nourished, in no acute distress. \par Eyes: no icterus seen. no conjunctival injection\par ENT: no tongue swelling, no nasal discharge\par Neck: no visible masses or goitre \par Pulmonary: no audible wheeze,  respirations unlabored\par Cardiovascular: no edema\par Abdomen: no distension\par Musculoskeletal: full range of motion, walking in the house\par Skin: no rash visible on face or upper chest\par Neurology: grossly intact. \par PSychiatric: affect appropriate. \par  [de-identified] : healed lumpectomy scar in the left breast.

## 2022-05-26 ENCOUNTER — OUTPATIENT (OUTPATIENT)
Dept: OUTPATIENT SERVICES | Facility: HOSPITAL | Age: 77
LOS: 1 days | Discharge: HOME | End: 2022-05-26

## 2022-05-26 DIAGNOSIS — C50.919 MALIGNANT NEOPLASM OF UNSPECIFIED SITE OF UNSPECIFIED FEMALE BREAST: ICD-10-CM

## 2022-05-26 DIAGNOSIS — Z98.890 OTHER SPECIFIED POSTPROCEDURAL STATES: Chronic | ICD-10-CM

## 2022-06-01 ENCOUNTER — APPOINTMENT (OUTPATIENT)
Dept: INFUSION THERAPY | Facility: HOSPITAL | Age: 77
End: 2022-06-01

## 2022-06-01 ENCOUNTER — APPOINTMENT (OUTPATIENT)
Dept: HEMATOLOGY ONCOLOGY | Facility: CLINIC | Age: 77
End: 2022-06-01

## 2022-06-01 DIAGNOSIS — C79.51 SECONDARY MALIGNANT NEOPLASM OF BONE: ICD-10-CM

## 2022-11-14 ENCOUNTER — OUTPATIENT (OUTPATIENT)
Dept: OUTPATIENT SERVICES | Facility: HOSPITAL | Age: 77
LOS: 1 days | End: 2022-11-14
Payer: MEDICARE

## 2022-11-14 ENCOUNTER — RESULT REVIEW (OUTPATIENT)
Age: 77
End: 2022-11-14

## 2022-11-14 ENCOUNTER — APPOINTMENT (OUTPATIENT)
Dept: ULTRASOUND IMAGING | Facility: IMAGING CENTER | Age: 77
End: 2022-11-14

## 2022-11-14 ENCOUNTER — APPOINTMENT (OUTPATIENT)
Dept: MAMMOGRAPHY | Facility: IMAGING CENTER | Age: 77
End: 2022-11-14

## 2022-11-14 DIAGNOSIS — Z98.890 OTHER SPECIFIED POSTPROCEDURAL STATES: Chronic | ICD-10-CM

## 2022-11-14 DIAGNOSIS — D05.12 INTRADUCTAL CARCINOMA IN SITU OF LEFT BREAST: ICD-10-CM

## 2022-11-14 PROCEDURE — G0279: CPT | Mod: 26

## 2022-11-14 PROCEDURE — 77066 DX MAMMO INCL CAD BI: CPT | Mod: 26

## 2022-11-14 PROCEDURE — 76641 ULTRASOUND BREAST COMPLETE: CPT | Mod: 26,50

## 2022-11-14 PROCEDURE — 76641 ULTRASOUND BREAST COMPLETE: CPT

## 2022-11-14 PROCEDURE — 77066 DX MAMMO INCL CAD BI: CPT

## 2022-11-14 PROCEDURE — G0279: CPT

## 2022-11-30 ENCOUNTER — OUTPATIENT (OUTPATIENT)
Dept: OUTPATIENT SERVICES | Facility: HOSPITAL | Age: 77
LOS: 1 days | Discharge: HOME | End: 2022-11-30

## 2022-11-30 DIAGNOSIS — Z98.890 OTHER SPECIFIED POSTPROCEDURAL STATES: Chronic | ICD-10-CM

## 2022-11-30 DIAGNOSIS — C50.919 MALIGNANT NEOPLASM OF UNSPECIFIED SITE OF UNSPECIFIED FEMALE BREAST: ICD-10-CM

## 2022-12-14 ENCOUNTER — APPOINTMENT (OUTPATIENT)
Dept: INFUSION THERAPY | Facility: HOSPITAL | Age: 77
End: 2022-12-14

## 2022-12-14 ENCOUNTER — APPOINTMENT (OUTPATIENT)
Dept: HEMATOLOGY ONCOLOGY | Facility: CLINIC | Age: 77
End: 2022-12-14

## 2022-12-14 VITALS
SYSTOLIC BLOOD PRESSURE: 155 MMHG | DIASTOLIC BLOOD PRESSURE: 77 MMHG | RESPIRATION RATE: 16 BRPM | WEIGHT: 111.31 LBS | OXYGEN SATURATION: 97 % | HEART RATE: 74 BPM | HEIGHT: 62.01 IN | BODY MASS INDEX: 20.23 KG/M2 | TEMPERATURE: 97.9 F

## 2022-12-14 DIAGNOSIS — J06.9 ACUTE UPPER RESPIRATORY INFECTION, UNSPECIFIED: ICD-10-CM

## 2022-12-14 DIAGNOSIS — Z79.811 LONG TERM (CURRENT) USE OF AROMATASE INHIBITORS: ICD-10-CM

## 2022-12-14 DIAGNOSIS — R23.2 FLUSHING: ICD-10-CM

## 2022-12-14 DIAGNOSIS — T45.1X5A FLUSHING: ICD-10-CM

## 2022-12-14 LAB
ALBUMIN SERPL ELPH-MCNC: 4.8 G/DL
ALP BLD-CCNC: 45 U/L
ALT SERPL-CCNC: 16 U/L
ANION GAP SERPL CALC-SCNC: 8 MMOL/L
AST SERPL-CCNC: 22 U/L
BILIRUB SERPL-MCNC: 0.6 MG/DL
BUN SERPL-MCNC: 19 MG/DL
CALCIUM SERPL-MCNC: 10.2 MG/DL
CHLORIDE SERPL-SCNC: 101 MMOL/L
CO2 SERPL-SCNC: 30 MMOL/L
CREAT SERPL-MCNC: 0.91 MG/DL
EGFR: 65 ML/MIN/1.73M2
GLUCOSE SERPL-MCNC: 97 MG/DL
POTASSIUM SERPL-SCNC: 4.7 MMOL/L
PROT SERPL-MCNC: 7.3 G/DL
SODIUM SERPL-SCNC: 139 MMOL/L

## 2022-12-14 PROCEDURE — 99214 OFFICE O/P EST MOD 30 MIN: CPT

## 2022-12-15 DIAGNOSIS — M81.0 AGE-RELATED OSTEOPOROSIS WITHOUT CURRENT PATHOLOGICAL FRACTURE: ICD-10-CM

## 2022-12-20 LAB
BASOPHILS # BLD AUTO: 0.06 K/UL
BASOPHILS NFR BLD AUTO: 0.5 %
EOSINOPHIL # BLD AUTO: 0.15 K/UL
EOSINOPHIL NFR BLD AUTO: 1.2 %
HCT VFR BLD CALC: 44.4 %
HGB BLD-MCNC: 14.3 G/DL
IMM GRANULOCYTES NFR BLD AUTO: 1.5 %
LYMPHOCYTES # BLD AUTO: 2.3 K/UL
LYMPHOCYTES NFR BLD AUTO: 18.6 %
MAN DIFF?: NORMAL
MCHC RBC-ENTMCNC: 30.8 PG
MCHC RBC-ENTMCNC: 32.2 GM/DL
MCV RBC AUTO: 95.7 FL
MONOCYTES # BLD AUTO: 0.98 K/UL
MONOCYTES NFR BLD AUTO: 7.9 %
NEUTROPHILS # BLD AUTO: 8.67 K/UL
NEUTROPHILS NFR BLD AUTO: 70.3 %
PLATELET # BLD AUTO: 296 K/UL
RBC # BLD: 4.64 M/UL
RBC # FLD: 12.1 %
WBC # FLD AUTO: 12.34 K/UL

## 2022-12-21 PROBLEM — J06.9 UPPER RESPIRATORY INFECTION: Status: RESOLVED | Noted: 2022-12-21 | Resolved: 2023-01-20

## 2022-12-21 NOTE — PHYSICAL EXAM
[Fully active, able to carry on all pre-disease performance without restriction] : Status 0 - Fully active, able to carry on all pre-disease performance without restriction [Normal] : clear to auscultation bilaterally, no dullness, no wheezing [de-identified] : \par Constitutional: well developed, well nourished, in no acute distress. \par Eyes: no icterus seen. no conjunctival injection\par ENT: no tongue swelling, no nasal discharge\par Neck: no visible masses or goitre \par Pulmonary: no audible wheeze,  respirations unlabored\par Cardiovascular: no edema\par Abdomen: no distension\par Musculoskeletal: full range of motion, walking in the house\par Skin: no rash visible on face or upper chest\par Neurology: grossly intact. \par PSychiatric: affect appropriate. \par  [de-identified] : healed lumpectomy scar in the left breast.

## 2022-12-21 NOTE — ASSESSMENT
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 She is status post lumpectomy with negative margins. Her medical history is significant for hypercholesterolemia , osteopenia and hypothyroidism. She is otherwise very healthy, has a great performance status and is working part-time. She started anastrazole 11/2017\par \par - DCIS: Clinically, no evidence of disease. She completed anastrozole 11/2022 Tolerated without significant side effects.  Reports good compliance.  She is up to date with breast imaging.11/14/2022 BI-RADS 2 \par She is noted to have elevated white count today 12/13/2022 but has URI x 1 week improving. no fever,  no cough or nasal congestion, \par - Osteoporosis: concern for worsening bone density due to anastrozole. Rec to  continue vit D and start calcium Qod. DEXA 1-2 yrs. DEXA 11/2021 osteoporosis. She has started prolia. Monitor for ONJ and hypocalcemia. Dental PRN Last dental visit 10/2022\par - Hypercalcemia in the past: now resolved. She will take calcium QOD and we will monitor levels\par - High cholesterol: concern for worsening cholesterol due to anastrozole. Pt is on zocor. Lipid profile annually. Lifestyle modifications reviewed- healthy eating and exercise\par - Mild hot flashes: 2/2 anastrozole. Advised to wear layers and use fan prn. Consider Effexor if get worse. Hot flashes resolved now\par \par RTC 6 m

## 2022-12-21 NOTE — CONSULT LETTER
[Please see my note below.] : Please see my note below. [Consult Closing:] : Thank you very much for allowing me to participate in the care of this patient.  If you have any questions, please do not hesitate to contact me. [Sincerely,] : Sincerely, [DrDipika  ___] : Dr. PATEL [FreeTextEntry3] : Angela Berry M.D.\par  of Medicine\par [facility]\par [facility] [place]\par [address] [place] [address]\par [address]\par Tele # [phone]; Fax [phone]

## 2022-12-21 NOTE — HISTORY OF PRESENT ILLNESS
[T: ___] : T[unfilled] [N: ___] : N[unfilled] [M: ___] : M[unfilled] [AJCC Stage: ____] : AJCC Stage: [unfilled] [8 - Distress Level] : Distress Level: 8 [Patient given social work contact information and resource sheet] : Patient was given social work contact information and resource sheet [0] : 0 [de-identified] : Ms. LAN CARO is a 72 year old female here for an evaluation of breast cancer. Her oncologic history is as follows:\par \par She underwent routine breast imaging on 4/17/17 which showed suspicious subcentimeter nodule in the left breast for which biopsy was recommended. She underwent left breast 11:00 ultrasound-guided core biopsy on 4/17/17 which showed atypical lobular hyperplasia. She underwent breast MRI on 5/12/17 which was read as BI-RADS 2\par \par She underwent excision with wire guided localization by Dr Filipe Pineda on 6/8/17 which showed intraductal carcinoma, grade 2, cribriform and micropapillary type with intermediate to low nuclear grade, 4 mm in greatest dimension. Intraductal carcinoma was 0.8 mm from the inked anterior margin. DCIS was strongly positive for ER and [place]. Focus of LCIS and atypical hyperplasia was noted.\par \par She seeked a second opinion from Dr. Hill. She underwent a breast MRI on 8/4/17 which showed a 2.1 cm area of enhancement in the upper outer quadrant of the left breast for which surgical excision was recommended.\par \par She underwent a left breast 11:00 core biopsy on 8/21/17 which showed ductal carcinoma in site 2, cribriform pattern with low grade nuclear atypia measured 1.5 mm with biopsy site changes.\par \par She underwent reexcision on 10/5/17 which showed a focus of atypical ductal hyperplasia and LCIS, classic type. Margins negative.\par \par Most recent bone density is from April 2017 which showed osteopenia with the lowest T score of -2.2 and the right femoral neck [de-identified] : Ms. LAN CARO  is here for a follow up for left breast DCIS on Anastrazole since 11/2017.\par \par Completed Anastrazole 11/2022, She has mild hot flashes. No aches/pain, no vag dryness, no excessive fatigue, no GI s/e. She is active, no change in energy, wt or appetite. Her cholesterol f/b PMD- well controlled\par mammogram with Dr Howard 9/2020, 11/2021, BIRADS 2\par DEXA- 05/2019- osteopenia(-1.9), 11/2021 osteoporosis. prolia started 11/2021. Rec to take ca+vit D, no dental issues, seeing regularly\par  is a pt of Dr Posada, s/p haplo 4 yrs ago\par s/p Moderna x 3\par She completed 5 yrs in 11/2022\par SHe is compliant with Prolia injections\par She is noted to have elevated white count today 12/13/2022 but has URI x 1 week no fever,  no cough or nasal congestion. \par

## 2023-02-21 ENCOUNTER — OUTPATIENT (OUTPATIENT)
Dept: OUTPATIENT SERVICES | Facility: HOSPITAL | Age: 78
LOS: 1 days | Discharge: HOME | End: 2023-02-21

## 2023-02-21 DIAGNOSIS — Z98.890 OTHER SPECIFIED POSTPROCEDURAL STATES: Chronic | ICD-10-CM

## 2023-02-21 DIAGNOSIS — C50.919 MALIGNANT NEOPLASM OF UNSPECIFIED SITE OF UNSPECIFIED FEMALE BREAST: ICD-10-CM

## 2023-03-01 ENCOUNTER — APPOINTMENT (OUTPATIENT)
Dept: HEMATOLOGY ONCOLOGY | Facility: CLINIC | Age: 78
End: 2023-03-01

## 2023-03-08 NOTE — DISCUSSION/SUMMARY
[FreeTextEntry1] : The visit was provided via telehealth using real-time 2-way audio visual technology. The patient, Melissa Keller, was located at home in [place], [place] at the time of the visit. The Genetic Counselor, Kushal Oliva, was located in [place], [place] at the time of the visit. The Genetic Counseling Intern, Laney Pyle, was located in [place], [place] at the time of the visit. The patient, Melissa Keller and Provider participated in the telehealth encounter. Consent for telehealth services was given on 2023 by the patient, Melissa Keller.\par \par REASON FOR CONSULT\par Melissa Keller is a 77-year-old female who was referred by Dr. Angela Berry for cancer genetic counseling and risk assessment due to a personal history of breast cancer and family history of breast and pancreatic cancer. She was briefly accompanied by her  for he visit. \par \par RELEVANT MEDICAL HISTORY\par Ms. Keller was diagnosed with left breast cancer (DCIS, ER+/MD+) in 2017 at age 72.  She was treated with lumpectomy 2017 and Anastrozole (5 years). In addition, she had another lumpectomy 10/04/2017 which noted LCIS and ADH. \par \par OTHER MEDICAL AND SURGICAL HISTORY:\par Hypothyroidism. HLD. \par \par PAST OB/GYN HISTORY:\par Obstetrical History: \par Age at Menarche: 13/15\par Menopausal with LMP in her mid. 50s \par Age at First Live Birth: 32\par Oral Contraceptive Use: No\par Hormone Replacement Therapy: No\par \par CANCER SCREENING HISTORY:  \par Breast: Last breast mammogram and sonogram 2022, heterogeneous background echotexture noted, otherwise normal. Frequency: yearly.\par GYN: Last visit 3-4 years ago, normal. \par Colon: Last colonoscopy 10 years ago, no polyps reported. Frequency: every 10 years. \par Skin: Last exam 3 years ago, no biopsies reported. Frequency: as needed. No concerns reported today. \par \par SOCIAL HISTORY:\par •	Tobacco-product use: Yes, formerly, quit 50 years ago. Pack per week for 6-7 years. \par \par FAMILY HISTORY:\par Maternal and paternal ancestry was reported as [place] (Ashkenazi [employer]). A detailed family history of cancer was ascertained, see below and scanned chart for pedigree. \par \par Of note, Ms. Keller reported her sister and three paternal cousins who had breast cancer had negative BRCA1/2 genetic testing. Copies of their report were not available for review. \par 	\par 	RISK ASSESSMENT:\par Ms. Keller’s personal history of breast cancer and family history of breast and pancreatic cancer in the setting of Ashkenazi [employer] heritage is suggestive of more than one hereditary cancer predisposition syndrome. We recommended genetic testing for a guidelines-based breast and gyn cancers panel and pancreatic cancer panel. This test analyzes 27 genes: APC, ERNESTINA, BARD1, BMPR1A, BRCA1, BRCA2, BRIP1, CDH1, CDKN2A, CHEK2, EPCAM, MEN1, MLH1, MSH2, MSH6, NF1, PALB2, PMS2, PTEN, RAD51C, RAD51D, SMAD4, STK11, TP53, TSC1, TSC2, VHL.\par \par We discussed the risks, benefits and limitations, and implications of genetic testing. We also discussed the psychosocial implications of genetic testing. Possible test results were reviewed with Ms. Keller, along with associated medical management options. The Genetic Information Non-discrimination Act (NIKA) was also reviewed.\par \par Ms. Keller verbally consented to the above mentioned genetic testing panel. [facility] informed consent form was emailed to the patient. A saliva sample kit was mailed to the patient today. Once the sample has been collected and sent out, Ms. Keller will inform us. \par \par PLAN:\par \par 1.	Saliva kit was sent to Ms. Keller’s home for collection. Once collected and dropped off, patient will inform us. \par 2.	Ms. Keller was sent a copy of the [employer] consent form via email to be filled, signed, and returned to us.\par 3.	We will contact Ms. Keller once the results are available and will schedule a follow-up appointment, as needed. Results generally return in 2-3 weeks from the day the sample kit is mailed.\par \par \par For any additional questions please call Cancer Genetics at [phone]. \par \par \par Kushal Oliva MS, [facility] [place]\par Genetic Counselor, Cancer Genetics\par \par \par CC: \par Dr. Angela Berry\par

## 2023-04-05 ENCOUNTER — NON-APPOINTMENT (OUTPATIENT)
Age: 78
End: 2023-04-05

## 2023-06-02 ENCOUNTER — OUTPATIENT (OUTPATIENT)
Dept: OUTPATIENT SERVICES | Facility: HOSPITAL | Age: 78
LOS: 1 days | Discharge: HOME | End: 2023-06-02

## 2023-06-02 DIAGNOSIS — C50.919 MALIGNANT NEOPLASM OF UNSPECIFIED SITE OF UNSPECIFIED FEMALE BREAST: ICD-10-CM

## 2023-06-02 DIAGNOSIS — Z98.890 OTHER SPECIFIED POSTPROCEDURAL STATES: Chronic | ICD-10-CM

## 2023-06-14 ENCOUNTER — APPOINTMENT (OUTPATIENT)
Dept: INFUSION THERAPY | Facility: HOSPITAL | Age: 78
End: 2023-06-14

## 2023-06-14 ENCOUNTER — APPOINTMENT (OUTPATIENT)
Dept: HEMATOLOGY ONCOLOGY | Facility: CLINIC | Age: 78
End: 2023-06-14
Payer: MEDICARE

## 2023-06-14 VITALS
HEIGHT: 62.01 IN | BODY MASS INDEX: 20.43 KG/M2 | DIASTOLIC BLOOD PRESSURE: 74 MMHG | SYSTOLIC BLOOD PRESSURE: 148 MMHG | RESPIRATION RATE: 17 BRPM | WEIGHT: 112.41 LBS | HEART RATE: 77 BPM | OXYGEN SATURATION: 98 %

## 2023-06-14 DIAGNOSIS — Z00.00 ENCOUNTER FOR GENERAL ADULT MEDICAL EXAMINATION W/OUT ABNORMAL FINDINGS: ICD-10-CM

## 2023-06-14 DIAGNOSIS — D05.12 INTRADUCTAL CARCINOMA IN SITU OF LEFT BREAST: ICD-10-CM

## 2023-06-14 DIAGNOSIS — M81.0 AGE-RELATED OSTEOPOROSIS W/OUT CURRENT PATHOLOGICAL FRACTURE: ICD-10-CM

## 2023-06-14 PROCEDURE — 99214 OFFICE O/P EST MOD 30 MIN: CPT

## 2023-06-14 RX ORDER — ANASTROZOLE TABLETS 1 MG/1
1 TABLET ORAL
Qty: 90 | Refills: 0 | Status: DISCONTINUED | COMMUNITY
Start: 2017-11-06 | End: 2023-06-14

## 2023-06-15 DIAGNOSIS — M81.0 AGE-RELATED OSTEOPOROSIS WITHOUT CURRENT PATHOLOGICAL FRACTURE: ICD-10-CM

## 2023-06-15 NOTE — HISTORY OF PRESENT ILLNESS
[T: ___] : T[unfilled] [N: ___] : N[unfilled] [M: ___] : M[unfilled] [AJCC Stage: ____] : AJCC Stage: [unfilled] [8 - Distress Level] : Distress Level: 8 [Patient given social work contact information and resource sheet] : Patient was given social work contact information and resource sheet [0] : 0 [de-identified] : Ms. LAN CARO is a 72 year old female here for an evaluation of breast cancer. Her oncologic history is as follows:\par \par She underwent routine breast imaging on 4/17/17 which showed suspicious subcentimeter nodule in the left breast for which biopsy was recommended. She underwent left breast 11:00 ultrasound-guided core biopsy on 4/17/17 which showed atypical lobular hyperplasia. She underwent breast MRI on 5/12/17 which was read as BI-RADS 2\par \par She underwent excision with wire guided localization by Dr Filipe Pineda on 6/8/17 which showed intraductal carcinoma, grade 2, cribriform and micropapillary type with intermediate to low nuclear grade, 4 mm in greatest dimension. Intraductal carcinoma was 0.8 mm from the inked anterior margin. DCIS was strongly positive for ER and HI. Focus of LCIS and atypical hyperplasia was noted.\par \par She seeked a second opinion from Dr. Hill. She underwent a breast MRI on 8/4/17 which showed a 2.1 cm area of enhancement in the upper outer quadrant of the left breast for which surgical excision was recommended.\par \par She underwent a left breast 11:00 core biopsy on 8/21/17 which showed ductal carcinoma in site 2, cribriform pattern with low grade nuclear atypia measured 1.5 mm with biopsy site changes.\par \par She underwent reexcision on 10/5/17 which showed a focus of atypical ductal hyperplasia and LCIS, classic type. Margins negative.\par \par Most recent bone density is from April 2017 which showed osteopenia with the lowest T score of -2.2 and the right femoral neck [de-identified] : Ms. LAN CARO  is here for a follow up for left breast DCIS on Anastrazole since 11/2017.\par \par Completed Anastrazole 11/2022, She has mild hot flashes. No aches/pain, no vag dryness, no excessive fatigue, no GI s/e. She is active, no change in energy, wt or appetite. Her cholesterol f/b PMD- well controlled\par She has no residual side effects from anasttrozole . Appetize and energy good\par mammogram with Dr Howard 9/2020, 11/2021, BIRADS 2 Due 11/2023\par DEXA- 05/2019- osteopenia(-1.9), 11/2021 osteoporosis. prolia started 11/2021. Rec to take ca+vit D, no dental issues, seeing regularly\par  is a pt of Dr Posada, s/p haplo 4 yrs ago\par s/p Moderna x 3\par She completed 5 yrs in 11/2022\par SHe is compliant with Prolia injections\par SHe had annual PE 5/2023 reviewed  BW 5/26/23 Ca 9.3 Creat 0.8\par \par

## 2023-06-15 NOTE — ASSESSMENT
[FreeTextEntry1] : Patient is a 73-year-old lady who is diagnosed with low to intermediate grade DCIS of the left breast, strongly ER/IA positive. She is status post lumpectomy with negative margins. Her medical history is significant for hypercholesterolemia , osteopenia and hypothyroidism. She is otherwise very healthy, has a great performance status and is working part-time. She started anastrazole 11/2017 Completed 5 years 11/2022\par \par - DCIS: Clinically, no evidence of disease. She completed anastrozole 11/2022 Tolerated without significant side effects. Annual breast imaging due 11/2023 ordered . 11/14/2022 BI-RADS 2 \par She was noted to have elevated white count 12/13/2022 s/p URI but 5/26/23 counts WNL  \par - Osteoporosis: concern for worsening bone density due to anastrozole. Rec to  continue vit D and start calcium Qod. DEXA 1-2 yrs. DEXA 11/2021 osteoporosis. Will get Prolia injection today 6/14/2023. Monitor for ONJ and hypocalcemia. Dental PRN Last dental visit 10/2022\par - Hypercalcemia in the past: now resolved. She will take calcium QOD and we will monitor levels\par - High cholesterol: concern for worsening cholesterol due to anastrozole. Pt is on zocor. Lipid profile annually. Lifestyle modifications reviewed- healthy eating and exercise\par - Mild hot flashes: 2/2 anastrozole. Advised to wear layers and use fan prn. Consider Effexor if get worse. Hot flashes resolved now\par \par RTC 6 m

## 2023-06-15 NOTE — PHYSICAL EXAM
[Fully active, able to carry on all pre-disease performance without restriction] : Status 0 - Fully active, able to carry on all pre-disease performance without restriction [Normal] : pharynx is unremarkable, moist mucus membrane, no oral lesions [de-identified] : \par Constitutional: well developed, well nourished, in no acute distress. \par Eyes: no icterus seen. no conjunctival injection\par ENT: no tongue swelling, no nasal discharge\par Neck: no visible masses or goitre \par Pulmonary: no audible wheeze,  respirations unlabored\par Cardiovascular: no edema\par Abdomen: no distension\par Musculoskeletal: full range of motion, walking in the house\par Skin: no rash visible on face or upper chest\par Neurology: grossly intact. \par PSychiatric: affect appropriate. \par  [de-identified] : healed lumpectomy scar in the left breast.

## 2023-06-19 ENCOUNTER — RESULT REVIEW (OUTPATIENT)
Age: 78
End: 2023-06-19

## 2023-11-15 ENCOUNTER — APPOINTMENT (OUTPATIENT)
Dept: RADIOLOGY | Facility: IMAGING CENTER | Age: 78
End: 2023-11-15
Payer: MEDICARE

## 2023-11-15 ENCOUNTER — RESULT REVIEW (OUTPATIENT)
Age: 78
End: 2023-11-15

## 2023-11-15 ENCOUNTER — APPOINTMENT (OUTPATIENT)
Dept: ULTRASOUND IMAGING | Facility: IMAGING CENTER | Age: 78
End: 2023-11-15
Payer: MEDICARE

## 2023-11-15 ENCOUNTER — APPOINTMENT (OUTPATIENT)
Dept: MAMMOGRAPHY | Facility: IMAGING CENTER | Age: 78
End: 2023-11-15
Payer: MEDICARE

## 2023-11-15 ENCOUNTER — OUTPATIENT (OUTPATIENT)
Dept: OUTPATIENT SERVICES | Facility: HOSPITAL | Age: 78
LOS: 1 days | End: 2023-11-15
Payer: MEDICARE

## 2023-11-15 DIAGNOSIS — Z00.8 ENCOUNTER FOR OTHER GENERAL EXAMINATION: ICD-10-CM

## 2023-11-15 DIAGNOSIS — Z98.890 OTHER SPECIFIED POSTPROCEDURAL STATES: Chronic | ICD-10-CM

## 2023-11-15 PROCEDURE — 77063 BREAST TOMOSYNTHESIS BI: CPT | Mod: 26

## 2023-11-15 PROCEDURE — 76641 ULTRASOUND BREAST COMPLETE: CPT | Mod: 26,50,GY

## 2023-11-15 PROCEDURE — 77080 DXA BONE DENSITY AXIAL: CPT | Mod: 26

## 2023-11-15 PROCEDURE — 77063 BREAST TOMOSYNTHESIS BI: CPT

## 2023-11-15 PROCEDURE — 76641 ULTRASOUND BREAST COMPLETE: CPT

## 2023-11-15 PROCEDURE — 77067 SCR MAMMO BI INCL CAD: CPT

## 2023-11-15 PROCEDURE — 77067 SCR MAMMO BI INCL CAD: CPT | Mod: 26

## 2023-11-15 PROCEDURE — 77080 DXA BONE DENSITY AXIAL: CPT

## 2023-11-16 ENCOUNTER — NON-APPOINTMENT (OUTPATIENT)
Age: 78
End: 2023-11-16

## 2023-12-22 ENCOUNTER — APPOINTMENT (OUTPATIENT)
Dept: HEMATOLOGY ONCOLOGY | Facility: CLINIC | Age: 78
End: 2023-12-22

## 2024-06-15 NOTE — PHYSICAL EXAM
PAST MEDICAL HISTORY:  No pertinent past medical history [Fully active, able to carry on all pre-disease performance without restriction] : Status 0 - Fully active, able to carry on all pre-disease performance without restriction [de-identified] : \par Constitutional: well developed, well nourished, in no acute distress. \par Eyes: no icterus seen. no conjunctival injection\par ENT: no tongue swelling, no nasal discharge\par Neck: no visible masses or goitre \par Pulmonary: no audible wheeze,  respirations unlabored\par Cardiovascular: no edema\par Abdomen: no distension\par Musculoskeletal: full range of motion, walking in the house\par Skin: no rash visible on face or upper chest\par Neurology: grossly intact. \par PSychiatric: affect appropriate. \par  [de-identified] : healed lumpectomy scar in the left breast.

## 2024-10-15 ENCOUNTER — APPOINTMENT (OUTPATIENT)
Dept: UROGYNECOLOGY | Facility: CLINIC | Age: 79
End: 2024-10-15

## 2024-10-21 ENCOUNTER — OUTPATIENT (OUTPATIENT)
Dept: OUTPATIENT SERVICES | Facility: HOSPITAL | Age: 79
LOS: 1 days | Discharge: HOME | End: 2024-10-21

## 2024-10-21 DIAGNOSIS — C50.919 MALIGNANT NEOPLASM OF UNSPECIFIED SITE OF UNSPECIFIED FEMALE BREAST: ICD-10-CM

## 2024-10-21 DIAGNOSIS — Z98.890 OTHER SPECIFIED POSTPROCEDURAL STATES: Chronic | ICD-10-CM

## 2024-10-28 ENCOUNTER — APPOINTMENT (OUTPATIENT)
Dept: HEMATOLOGY ONCOLOGY | Facility: CLINIC | Age: 79
End: 2024-10-28
Payer: MEDICARE

## 2024-10-28 VITALS
RESPIRATION RATE: 17 BRPM | BODY MASS INDEX: 19.35 KG/M2 | HEART RATE: 70 BPM | DIASTOLIC BLOOD PRESSURE: 76 MMHG | WEIGHT: 105.82 LBS | OXYGEN SATURATION: 98 % | SYSTOLIC BLOOD PRESSURE: 132 MMHG

## 2024-10-28 DIAGNOSIS — D05.12 INTRADUCTAL CARCINOMA IN SITU OF LEFT BREAST: ICD-10-CM

## 2024-10-28 PROCEDURE — G2211 COMPLEX E/M VISIT ADD ON: CPT

## 2024-10-28 PROCEDURE — 99213 OFFICE O/P EST LOW 20 MIN: CPT

## 2024-11-20 ENCOUNTER — APPOINTMENT (OUTPATIENT)
Dept: MAMMOGRAPHY | Facility: IMAGING CENTER | Age: 79
End: 2024-11-20
Payer: MEDICARE

## 2024-11-20 ENCOUNTER — RESULT REVIEW (OUTPATIENT)
Age: 79
End: 2024-11-20

## 2024-11-20 ENCOUNTER — OUTPATIENT (OUTPATIENT)
Dept: OUTPATIENT SERVICES | Facility: HOSPITAL | Age: 79
LOS: 1 days | End: 2024-11-20
Payer: MEDICARE

## 2024-11-20 DIAGNOSIS — Z98.890 OTHER SPECIFIED POSTPROCEDURAL STATES: Chronic | ICD-10-CM

## 2024-11-20 DIAGNOSIS — D05.12 INTRADUCTAL CARCINOMA IN SITU OF LEFT BREAST: ICD-10-CM

## 2024-11-20 PROCEDURE — 77067 SCR MAMMO BI INCL CAD: CPT | Mod: 26

## 2024-11-20 PROCEDURE — 77063 BREAST TOMOSYNTHESIS BI: CPT

## 2024-11-20 PROCEDURE — 77063 BREAST TOMOSYNTHESIS BI: CPT | Mod: 26

## 2024-11-20 PROCEDURE — 77067 SCR MAMMO BI INCL CAD: CPT

## 2025-03-11 ENCOUNTER — OUTPATIENT (OUTPATIENT)
Dept: OUTPATIENT SERVICES | Facility: HOSPITAL | Age: 80
LOS: 1 days | End: 2025-03-11
Payer: MEDICARE

## 2025-03-11 ENCOUNTER — APPOINTMENT (OUTPATIENT)
Dept: ULTRASOUND IMAGING | Facility: IMAGING CENTER | Age: 80
End: 2025-03-11
Payer: MEDICARE

## 2025-03-11 ENCOUNTER — RESULT REVIEW (OUTPATIENT)
Age: 80
End: 2025-03-11

## 2025-03-11 DIAGNOSIS — Z98.890 OTHER SPECIFIED POSTPROCEDURAL STATES: Chronic | ICD-10-CM

## 2025-03-11 DIAGNOSIS — D05.12 INTRADUCTAL CARCINOMA IN SITU OF LEFT BREAST: ICD-10-CM

## 2025-03-11 PROCEDURE — 76641 ULTRASOUND BREAST COMPLETE: CPT | Mod: 26,50,GA

## 2025-03-11 PROCEDURE — 76641 ULTRASOUND BREAST COMPLETE: CPT
